# Patient Record
Sex: MALE | Employment: OTHER | URBAN - METROPOLITAN AREA
[De-identification: names, ages, dates, MRNs, and addresses within clinical notes are randomized per-mention and may not be internally consistent; named-entity substitution may affect disease eponyms.]

---

## 2019-07-17 ENCOUNTER — HOSPITAL ENCOUNTER (OUTPATIENT)
Facility: CLINIC | Age: 64
End: 2019-07-17

## 2019-07-17 ENCOUNTER — HOSPITAL ENCOUNTER (INPATIENT)
Facility: CLINIC | Age: 64
LOS: 2 days | Discharge: HOME | DRG: 229 | End: 2019-07-19
Attending: INTERNAL MEDICINE | Admitting: INTERNAL MEDICINE

## 2019-07-17 DIAGNOSIS — I21.19 ST ELEVATION MYOCARDIAL INFARCTION (STEMI) OF INFERIOR WALL (H): ICD-10-CM

## 2019-07-17 DIAGNOSIS — I10 ESSENTIAL HYPERTENSION, MALIGNANT: ICD-10-CM

## 2019-07-17 DIAGNOSIS — I21.19 ACUTE ST ELEVATION MYOCARDIAL INFARCTION (STEMI) OF INFERIOR WALL (H): Primary | ICD-10-CM

## 2019-07-17 LAB
ALBUMIN SERPL-MCNC: 3 G/DL (ref 3.4–5)
ALP SERPL-CCNC: 80 U/L (ref 40–150)
ALT SERPL W P-5'-P-CCNC: 25 U/L (ref 0–70)
ANION GAP SERPL CALCULATED.3IONS-SCNC: 9 MMOL/L (ref 3–14)
AST SERPL W P-5'-P-CCNC: 21 U/L (ref 0–45)
B-HCG FREE SERPL-ACNC: 1 [IU]/L (ref 0.86–1.14)
BILIRUB SERPL-MCNC: 0.5 MG/DL (ref 0.2–1.3)
BUN SERPL-MCNC: 17 MG/DL (ref 7–30)
CA-I BLD-SCNC: 4 MG/DL (ref 4.4–5.2)
CALCIUM SERPL-MCNC: 7.4 MG/DL (ref 8.5–10.1)
CHLORIDE SERPL-SCNC: 108 MMOL/L (ref 94–109)
CO2 BLD-SCNC: 22 MMOL/L (ref 21–28)
CO2 SERPL-SCNC: 24 MMOL/L (ref 20–32)
CREAT BLD-MCNC: 1.4 MG/DL (ref 0.66–1.25)
CREAT SERPL-MCNC: 1.38 MG/DL (ref 0.66–1.25)
ERYTHROCYTE [DISTWIDTH] IN BLOOD BY AUTOMATED COUNT: 12.2 % (ref 10–15)
GFR SERPL CREATININE-BSD FRML MDRD: 51 ML/MIN/{1.73_M2}
GFR SERPL CREATININE-BSD FRML MDRD: 53 ML/MIN/{1.73_M2}
GLUCOSE BLD-MCNC: 135 MG/DL (ref 70–99)
GLUCOSE SERPL-MCNC: 133 MG/DL (ref 70–99)
HCT VFR BLD AUTO: 41.5 % (ref 40–53)
HCT VFR BLD CALC: 36 %PCV (ref 40–53)
HGB BLD CALC-MCNC: 12.2 G/DL (ref 13.3–17.7)
HGB BLD-MCNC: 13.4 G/DL (ref 13.3–17.7)
INR PPP: 1.07 (ref 0.86–1.14)
KCT BLD-ACNC: 231 SEC (ref 75–150)
KCT BLD-ACNC: 255 SEC (ref 75–150)
KCT BLD-ACNC: 360 SEC (ref 75–150)
KCT BLD-ACNC: 384 SEC (ref 75–150)
MCH RBC QN AUTO: 31.2 PG (ref 26.5–33)
MCHC RBC AUTO-ENTMCNC: 32.3 G/DL (ref 31.5–36.5)
MCV RBC AUTO: 97 FL (ref 78–100)
NT-PROBNP SERPL-MCNC: 76 PG/ML (ref 0–900)
PCO2 BLD: 49 MM HG (ref 35–45)
PH BLD: 7.27 PH (ref 7.35–7.45)
PLATELET # BLD AUTO: 207 10E9/L (ref 150–450)
PO2 BLD: 166 MM HG (ref 80–105)
POTASSIUM BLD-SCNC: 2.9 MMOL/L (ref 3.4–5.3)
POTASSIUM SERPL-SCNC: 2.8 MMOL/L (ref 3.4–5.3)
PROT SERPL-MCNC: 6.4 G/DL (ref 6.8–8.8)
RBC # BLD AUTO: 4.3 10E12/L (ref 4.4–5.9)
SAO2 % BLDA FROM PO2: 99 % (ref 92–100)
SODIUM BLD-SCNC: 142 MMOL/L (ref 133–144)
SODIUM SERPL-SCNC: 140 MMOL/L (ref 133–144)
TROPONIN I BLD-MCNC: 0 UG/L (ref 0–0.08)
TROPONIN I SERPL-MCNC: <0.015 UG/L (ref 0–0.04)
WBC # BLD AUTO: 14.3 10E9/L (ref 4–11)

## 2019-07-17 PROCEDURE — 99285 EMERGENCY DEPT VISIT HI MDM: CPT | Mod: 25 | Performed by: EMERGENCY MEDICINE

## 2019-07-17 PROCEDURE — 84295 ASSAY OF SERUM SODIUM: CPT

## 2019-07-17 PROCEDURE — 80053 COMPREHEN METABOLIC PANEL: CPT | Performed by: EMERGENCY MEDICINE

## 2019-07-17 PROCEDURE — 96374 THER/PROPH/DIAG INJ IV PUSH: CPT | Performed by: EMERGENCY MEDICINE

## 2019-07-17 PROCEDURE — 20000004 ZZH R&B ICU UMMC

## 2019-07-17 PROCEDURE — 84484 ASSAY OF TROPONIN QUANT: CPT | Performed by: EMERGENCY MEDICINE

## 2019-07-17 PROCEDURE — C1887 CATHETER, GUIDING: HCPCS | Performed by: INTERNAL MEDICINE

## 2019-07-17 PROCEDURE — 0272376 DILATION OF CORONARY ARTERY, THREE ARTERIES, BIFURCATION, WITH FOUR OR MORE DRUG-ELUTING INTRALUMINAL DEVICES, PERCUTANEOUS APPROACH: ICD-10-PCS | Performed by: INTERNAL MEDICINE

## 2019-07-17 PROCEDURE — 80061 LIPID PANEL: CPT | Performed by: INTERNAL MEDICINE

## 2019-07-17 PROCEDURE — 99291 CRITICAL CARE FIRST HOUR: CPT | Mod: 25 | Performed by: EMERGENCY MEDICINE

## 2019-07-17 PROCEDURE — 25000125 ZZHC RX 250: Performed by: INTERNAL MEDICINE

## 2019-07-17 PROCEDURE — 84484 ASSAY OF TROPONIN QUANT: CPT | Performed by: INTERNAL MEDICINE

## 2019-07-17 PROCEDURE — 82565 ASSAY OF CREATININE: CPT

## 2019-07-17 PROCEDURE — 25000128 H RX IP 250 OP 636: Performed by: EMERGENCY MEDICINE

## 2019-07-17 PROCEDURE — 25000128 H RX IP 250 OP 636

## 2019-07-17 PROCEDURE — 93010 ELECTROCARDIOGRAM REPORT: CPT | Mod: Z6 | Performed by: EMERGENCY MEDICINE

## 2019-07-17 PROCEDURE — C9600 PERC DRUG-EL COR STENT SING: HCPCS | Performed by: INTERNAL MEDICINE

## 2019-07-17 PROCEDURE — 85014 HEMATOCRIT: CPT

## 2019-07-17 PROCEDURE — 25000128 H RX IP 250 OP 636: Performed by: INTERNAL MEDICINE

## 2019-07-17 PROCEDURE — C1769 GUIDE WIRE: HCPCS | Performed by: INTERNAL MEDICINE

## 2019-07-17 PROCEDURE — C9606 PERC D-E COR REVASC W AMI S: HCPCS | Mod: RC | Performed by: INTERNAL MEDICINE

## 2019-07-17 PROCEDURE — 25000132 ZZH RX MED GY IP 250 OP 250 PS 637: Performed by: EMERGENCY MEDICINE

## 2019-07-17 PROCEDURE — 85610 PROTHROMBIN TIME: CPT | Performed by: EMERGENCY MEDICINE

## 2019-07-17 PROCEDURE — C1757 CATH, THROMBECTOMY/EMBOLECT: HCPCS | Performed by: INTERNAL MEDICINE

## 2019-07-17 PROCEDURE — 99223 1ST HOSP IP/OBS HIGH 75: CPT | Mod: AI | Performed by: INTERNAL MEDICINE

## 2019-07-17 PROCEDURE — 25000128 H RX IP 250 OP 636: Performed by: FAMILY MEDICINE

## 2019-07-17 PROCEDURE — 85027 COMPLETE CBC AUTOMATED: CPT | Performed by: EMERGENCY MEDICINE

## 2019-07-17 PROCEDURE — 99153 MOD SED SAME PHYS/QHP EA: CPT | Performed by: INTERNAL MEDICINE

## 2019-07-17 PROCEDURE — 02C00ZZ EXTIRPATION OF MATTER FROM CORONARY ARTERY, ONE ARTERY, OPEN APPROACH: ICD-10-PCS | Performed by: INTERNAL MEDICINE

## 2019-07-17 PROCEDURE — 82330 ASSAY OF CALCIUM: CPT

## 2019-07-17 PROCEDURE — 93010 ELECTROCARDIOGRAM REPORT: CPT | Mod: 76 | Performed by: INTERNAL MEDICINE

## 2019-07-17 PROCEDURE — 96375 TX/PRO/DX INJ NEW DRUG ADDON: CPT | Performed by: EMERGENCY MEDICINE

## 2019-07-17 PROCEDURE — 36415 COLL VENOUS BLD VENIPUNCTURE: CPT | Performed by: INTERNAL MEDICINE

## 2019-07-17 PROCEDURE — 84484 ASSAY OF TROPONIN QUANT: CPT

## 2019-07-17 PROCEDURE — C1874 STENT, COATED/COV W/DEL SYS: HCPCS | Performed by: INTERNAL MEDICINE

## 2019-07-17 PROCEDURE — C1725 CATH, TRANSLUMIN NON-LASER: HCPCS | Performed by: INTERNAL MEDICINE

## 2019-07-17 PROCEDURE — 85610 PROTHROMBIN TIME: CPT

## 2019-07-17 PROCEDURE — 93454 CORONARY ARTERY ANGIO S&I: CPT | Mod: XU | Performed by: INTERNAL MEDICINE

## 2019-07-17 PROCEDURE — 99152 MOD SED SAME PHYS/QHP 5/>YRS: CPT | Performed by: INTERNAL MEDICINE

## 2019-07-17 PROCEDURE — 93005 ELECTROCARDIOGRAM TRACING: CPT | Performed by: EMERGENCY MEDICINE

## 2019-07-17 PROCEDURE — C1894 INTRO/SHEATH, NON-LASER: HCPCS | Performed by: INTERNAL MEDICINE

## 2019-07-17 PROCEDURE — 83880 ASSAY OF NATRIURETIC PEPTIDE: CPT | Performed by: EMERGENCY MEDICINE

## 2019-07-17 PROCEDURE — 82803 BLOOD GASES ANY COMBINATION: CPT

## 2019-07-17 PROCEDURE — 27210794 ZZH OR GENERAL SUPPLY STERILE: Performed by: INTERNAL MEDICINE

## 2019-07-17 PROCEDURE — 82947 ASSAY GLUCOSE BLOOD QUANT: CPT

## 2019-07-17 PROCEDURE — 85347 COAGULATION TIME ACTIVATED: CPT

## 2019-07-17 PROCEDURE — 84132 ASSAY OF SERUM POTASSIUM: CPT

## 2019-07-17 PROCEDURE — C1760 CLOSURE DEV, VASC: HCPCS | Performed by: INTERNAL MEDICINE

## 2019-07-17 DEVICE — STENT SYNERGY DRUG ELUTING 3.00X38MM  H7493926038300: Type: IMPLANTABLE DEVICE | Status: FUNCTIONAL

## 2019-07-17 DEVICE — STENT SYNERGY DRUG ELUTING 4.00X28MM  H7493926028400: Type: IMPLANTABLE DEVICE | Status: FUNCTIONAL

## 2019-07-17 DEVICE — STENT SYNERGY DRUG ELUTING 4.00X12MM  H7493926012400: Type: IMPLANTABLE DEVICE | Status: FUNCTIONAL

## 2019-07-17 DEVICE — STENT SYNERGY DRUG ELUTING 3.00X32MM  H7493926032300: Type: IMPLANTABLE DEVICE | Status: FUNCTIONAL

## 2019-07-17 RX ORDER — MORPHINE SULFATE 2 MG/ML
2 INJECTION, SOLUTION INTRAMUSCULAR; INTRAVENOUS ONCE
Status: COMPLETED | OUTPATIENT
Start: 2019-07-17 | End: 2019-07-17

## 2019-07-17 RX ORDER — HEPARIN SODIUM 10000 [USP'U]/100ML
0-3500 INJECTION, SOLUTION INTRAVENOUS CONTINUOUS
Status: DISCONTINUED | OUTPATIENT
Start: 2019-07-17 | End: 2019-07-17

## 2019-07-17 RX ORDER — NALOXONE HYDROCHLORIDE 0.4 MG/ML
.1-.4 INJECTION, SOLUTION INTRAMUSCULAR; INTRAVENOUS; SUBCUTANEOUS
Status: DISCONTINUED | OUTPATIENT
Start: 2019-07-17 | End: 2019-07-19 | Stop reason: HOSPADM

## 2019-07-17 RX ORDER — ATROPINE SULFATE 0.1 MG/ML
INJECTION INTRAVENOUS
Status: DISCONTINUED | OUTPATIENT
Start: 2019-07-17 | End: 2019-07-17 | Stop reason: HOSPADM

## 2019-07-17 RX ORDER — NALOXONE HYDROCHLORIDE 0.4 MG/ML
.2-.4 INJECTION, SOLUTION INTRAMUSCULAR; INTRAVENOUS; SUBCUTANEOUS
Status: ACTIVE | OUTPATIENT
Start: 2019-07-17 | End: 2019-07-18

## 2019-07-17 RX ORDER — SODIUM CHLORIDE 9 MG/ML
INJECTION, SOLUTION INTRAVENOUS CONTINUOUS
Status: ACTIVE | OUTPATIENT
Start: 2019-07-17 | End: 2019-07-18

## 2019-07-17 RX ORDER — EPTIFIBATIDE 2 MG/ML
180 INJECTION, SOLUTION INTRAVENOUS ONCE
Status: COMPLETED | OUTPATIENT
Start: 2019-07-17 | End: 2019-07-17

## 2019-07-17 RX ORDER — POTASSIUM CHLORIDE 7.45 MG/ML
10 INJECTION INTRAVENOUS ONCE
Status: COMPLETED | OUTPATIENT
Start: 2019-07-17 | End: 2019-07-17

## 2019-07-17 RX ORDER — LISINOPRIL 2.5 MG/1
5 TABLET ORAL DAILY
Status: DISCONTINUED | OUTPATIENT
Start: 2019-07-18 | End: 2019-07-17 | Stop reason: CLARIF

## 2019-07-17 RX ORDER — HEPARIN SODIUM 1000 [USP'U]/ML
INJECTION, SOLUTION INTRAVENOUS; SUBCUTANEOUS
Status: DISCONTINUED | OUTPATIENT
Start: 2019-07-17 | End: 2019-07-17 | Stop reason: HOSPADM

## 2019-07-17 RX ORDER — LISINOPRIL 2.5 MG/1
2.5 TABLET ORAL DAILY
Status: DISCONTINUED | OUTPATIENT
Start: 2019-07-17 | End: 2019-07-19

## 2019-07-17 RX ORDER — NITROGLYCERIN 0.4 MG/1
0.4 TABLET SUBLINGUAL EVERY 5 MIN PRN
Status: DISCONTINUED | OUTPATIENT
Start: 2019-07-17 | End: 2019-07-19 | Stop reason: HOSPADM

## 2019-07-17 RX ORDER — IOPAMIDOL 755 MG/ML
INJECTION, SOLUTION INTRAVASCULAR
Status: DISCONTINUED | OUTPATIENT
Start: 2019-07-17 | End: 2019-07-17 | Stop reason: HOSPADM

## 2019-07-17 RX ORDER — ATORVASTATIN CALCIUM 80 MG/1
80 TABLET, FILM COATED ORAL DAILY
Status: DISCONTINUED | OUTPATIENT
Start: 2019-07-18 | End: 2019-07-19 | Stop reason: HOSPADM

## 2019-07-17 RX ORDER — ASPIRIN 81 MG/1
81 TABLET ORAL DAILY
Status: DISCONTINUED | OUTPATIENT
Start: 2019-07-18 | End: 2019-07-19 | Stop reason: HOSPADM

## 2019-07-17 RX ORDER — ONDANSETRON 2 MG/ML
4 INJECTION INTRAMUSCULAR; INTRAVENOUS EVERY 6 HOURS PRN
Status: DISCONTINUED | OUTPATIENT
Start: 2019-07-17 | End: 2019-07-19 | Stop reason: HOSPADM

## 2019-07-17 RX ORDER — EPTIFIBATIDE 2 MG/ML
2 INJECTION, SOLUTION INTRAVENOUS CONTINUOUS
Status: ACTIVE | OUTPATIENT
Start: 2019-07-17 | End: 2019-07-18

## 2019-07-17 RX ORDER — FLUMAZENIL 0.1 MG/ML
0.2 INJECTION, SOLUTION INTRAVENOUS
Status: ACTIVE | OUTPATIENT
Start: 2019-07-17 | End: 2019-07-18

## 2019-07-17 RX ORDER — PHENYLEPHRINE HYDROCHLORIDE 10 MG/ML
INJECTION INTRAVENOUS
Status: DISCONTINUED | OUTPATIENT
Start: 2019-07-17 | End: 2019-07-17 | Stop reason: HOSPADM

## 2019-07-17 RX ORDER — MORPHINE SULFATE 2 MG/ML
INJECTION, SOLUTION INTRAMUSCULAR; INTRAVENOUS
Status: COMPLETED
Start: 2019-07-17 | End: 2019-07-17

## 2019-07-17 RX ORDER — FENTANYL CITRATE 50 UG/ML
INJECTION, SOLUTION INTRAMUSCULAR; INTRAVENOUS
Status: DISCONTINUED | OUTPATIENT
Start: 2019-07-17 | End: 2019-07-17 | Stop reason: HOSPADM

## 2019-07-17 RX ORDER — ATROPINE SULFATE 0.1 MG/ML
0.5 INJECTION INTRAVENOUS EVERY 5 MIN PRN
Status: ACTIVE | OUTPATIENT
Start: 2019-07-17 | End: 2019-07-18

## 2019-07-17 RX ORDER — FENTANYL CITRATE 50 UG/ML
25-50 INJECTION, SOLUTION INTRAMUSCULAR; INTRAVENOUS
Status: ACTIVE | OUTPATIENT
Start: 2019-07-17 | End: 2019-07-18

## 2019-07-17 RX ADMIN — POTASSIUM CHLORIDE 10 MEQ: 7.46 INJECTION, SOLUTION INTRAVENOUS at 21:04

## 2019-07-17 RX ADMIN — POTASSIUM CHLORIDE 10 MEQ: 7.46 INJECTION, SOLUTION INTRAVENOUS at 20:04

## 2019-07-17 RX ADMIN — HEPARIN SODIUM 900 UNITS/HR: 10000 INJECTION, SOLUTION INTRAVENOUS at 19:18

## 2019-07-17 RX ADMIN — MORPHINE SULFATE 2 MG: 2 INJECTION, SOLUTION INTRAMUSCULAR; INTRAVENOUS at 19:20

## 2019-07-17 RX ADMIN — EPTIFIBATIDE 2 MCG/KG/MIN: 2 INJECTION, SOLUTION INTRAVENOUS at 21:05

## 2019-07-17 RX ADMIN — TICAGRELOR 180 MG: 90 TABLET ORAL at 19:15

## 2019-07-17 RX ADMIN — EPTIFIBATIDE 2 MG: 2 INJECTION, SOLUTION INTRAVENOUS at 21:04

## 2019-07-17 RX ADMIN — MORPHINE SULFATE 2 MG: 2 INJECTION, SOLUTION INTRAMUSCULAR; INTRAVENOUS at 19:10

## 2019-07-17 ASSESSMENT — MIFFLIN-ST. JEOR
SCORE: 1460.75
SCORE: 1460.75

## 2019-07-17 NOTE — Clinical Note
The first balloon was inserted into the circumflex and proximal circumflex.Max pressure = 8 danielle. Total duration = 8 seconds.     Max pressure = 8 danielle. Total duration = 5 seconds.    Balloon reinflated a second time: Max pressure = 8 danielle. Total duration = 5 seconds.

## 2019-07-17 NOTE — Clinical Note
Stent deployed in the proximal circumflex. Max pressure = 11 danielle. Total duration = 6 seconds. Balloon reinflated a second time: Max pressure = 13 danielle. Total duration = 5 seconds.

## 2019-07-17 NOTE — Clinical Note
The first balloon was inserted into the left main and left main.Max pressure = 10 danielle. Total duration = 5 seconds.

## 2019-07-17 NOTE — Clinical Note
dry, intact, no bleeding and no hematoma. 6 Fr sheath removed from the RFA. 6 Fr perclose used to close the groin. Tegaderm applied.

## 2019-07-17 NOTE — Clinical Note
The first balloon was inserted into the right coronary artery and proximal right coronary artery.Max pressure = 12 danielle. Total duration = 8 seconds.     Max pressure = 12 danielle. Total duration = 5 seconds.    Balloon reinflated a second time: Max pressure = 12 danielle. Total duration = 5 seconds.

## 2019-07-17 NOTE — Clinical Note
The first balloon was inserted into the right coronary artery and proximal right coronary artery.Max pressure = 8 danielle. Total duration = 10 seconds.

## 2019-07-17 NOTE — Clinical Note
Stent deployed in the proximal right coronary artery. Max pressure = 14 danielle. Total duration = 10 seconds.

## 2019-07-17 NOTE — Clinical Note
Pt received 4600 units of Heparin bolus, Brilinta 180 mg and  mg in the ED per ED RN report. MD olmos.

## 2019-07-17 NOTE — Clinical Note
The first balloon was inserted into the left main and left main.Max pressure = 8 danielle. Total duration = 4 seconds.     Max pressure = 18 danielle. Total duration = 8 seconds.    Balloon reinflated a second time: Max pressure = 18 danielle. Total duration = 8 seconds.

## 2019-07-17 NOTE — Clinical Note
The first balloon was inserted into the circumflex and distal circumflex.Max pressure = 11 danielle. Total duration = 6 seconds.

## 2019-07-18 ENCOUNTER — APPOINTMENT (OUTPATIENT)
Dept: MRI IMAGING | Facility: CLINIC | Age: 64
DRG: 229 | End: 2019-07-18

## 2019-07-18 LAB
ANION GAP SERPL CALCULATED.3IONS-SCNC: 10 MMOL/L (ref 3–14)
BUN SERPL-MCNC: 19 MG/DL (ref 7–30)
CALCIUM SERPL-MCNC: 7.8 MG/DL (ref 8.5–10.1)
CHLORIDE SERPL-SCNC: 108 MMOL/L (ref 94–109)
CHOLEST SERPL-MCNC: 215 MG/DL
CO2 SERPL-SCNC: 22 MMOL/L (ref 20–32)
CREAT SERPL-MCNC: 1.33 MG/DL (ref 0.66–1.25)
ERYTHROCYTE [DISTWIDTH] IN BLOOD BY AUTOMATED COUNT: 12.6 % (ref 10–15)
GFR SERPL CREATININE-BSD FRML MDRD: 55 ML/MIN/{1.73_M2}
GLUCOSE BLDC GLUCOMTR-MCNC: 132 MG/DL (ref 70–99)
GLUCOSE SERPL-MCNC: 125 MG/DL (ref 70–99)
HBA1C MFR BLD: 5.9 % (ref 0–5.6)
HCT VFR BLD AUTO: 46 % (ref 40–53)
HDLC SERPL-MCNC: 40 MG/DL
HGB BLD-MCNC: 14.6 G/DL (ref 13.3–17.7)
INTERPRETATION ECG - MUSE: NORMAL
LDLC SERPL CALC-MCNC: 158 MG/DL
LMWH PPP CHRO-ACNC: <0.1 IU/ML
MCH RBC QN AUTO: 31.7 PG (ref 26.5–33)
MCHC RBC AUTO-ENTMCNC: 31.7 G/DL (ref 31.5–36.5)
MCV RBC AUTO: 100 FL (ref 78–100)
NONHDLC SERPL-MCNC: 175 MG/DL
PLATELET # BLD AUTO: 215 10E9/L (ref 150–450)
POTASSIUM SERPL-SCNC: 4.7 MMOL/L (ref 3.4–5.3)
RBC # BLD AUTO: 4.6 10E12/L (ref 4.4–5.9)
SODIUM SERPL-SCNC: 140 MMOL/L (ref 133–144)
TRIGL SERPL-MCNC: 83 MG/DL
TROPONIN I SERPL-MCNC: 35.73 UG/L (ref 0–0.04)
TROPONIN I SERPL-MCNC: 59.83 UG/L (ref 0–0.04)
TROPONIN I SERPL-MCNC: 83.55 UG/L (ref 0–0.04)
WBC # BLD AUTO: 16 10E9/L (ref 4–11)

## 2019-07-18 PROCEDURE — 83036 HEMOGLOBIN GLYCOSYLATED A1C: CPT | Performed by: INTERNAL MEDICINE

## 2019-07-18 PROCEDURE — 00000146 ZZHCL STATISTIC GLUCOSE BY METER IP

## 2019-07-18 PROCEDURE — 25800030 ZZH RX IP 258 OP 636: Performed by: INTERNAL MEDICINE

## 2019-07-18 PROCEDURE — 25500064 ZZH RX 255 OP 636: Performed by: INTERNAL MEDICINE

## 2019-07-18 PROCEDURE — 36415 COLL VENOUS BLD VENIPUNCTURE: CPT | Performed by: EMERGENCY MEDICINE

## 2019-07-18 PROCEDURE — 36415 COLL VENOUS BLD VENIPUNCTURE: CPT | Performed by: INTERNAL MEDICINE

## 2019-07-18 PROCEDURE — A9585 GADOBUTROL INJECTION: HCPCS | Performed by: INTERNAL MEDICINE

## 2019-07-18 PROCEDURE — 25000128 H RX IP 250 OP 636: Performed by: INTERNAL MEDICINE

## 2019-07-18 PROCEDURE — 25000132 ZZH RX MED GY IP 250 OP 250 PS 637: Performed by: INTERNAL MEDICINE

## 2019-07-18 PROCEDURE — 85027 COMPLETE CBC AUTOMATED: CPT | Performed by: INTERNAL MEDICINE

## 2019-07-18 PROCEDURE — 93010 ELECTROCARDIOGRAM REPORT: CPT | Performed by: INTERNAL MEDICINE

## 2019-07-18 PROCEDURE — 93005 ELECTROCARDIOGRAM TRACING: CPT

## 2019-07-18 PROCEDURE — 25000132 ZZH RX MED GY IP 250 OP 250 PS 637: Performed by: STUDENT IN AN ORGANIZED HEALTH CARE EDUCATION/TRAINING PROGRAM

## 2019-07-18 PROCEDURE — 75561 CARDIAC MRI FOR MORPH W/DYE: CPT | Mod: 26 | Performed by: INTERNAL MEDICINE

## 2019-07-18 PROCEDURE — 21400000 ZZH R&B CCU UMMC

## 2019-07-18 PROCEDURE — 80048 BASIC METABOLIC PNL TOTAL CA: CPT | Performed by: INTERNAL MEDICINE

## 2019-07-18 PROCEDURE — 99233 SBSQ HOSP IP/OBS HIGH 50: CPT | Mod: GC | Performed by: INTERNAL MEDICINE

## 2019-07-18 PROCEDURE — 75561 CARDIAC MRI FOR MORPH W/DYE: CPT

## 2019-07-18 PROCEDURE — 84484 ASSAY OF TROPONIN QUANT: CPT | Performed by: INTERNAL MEDICINE

## 2019-07-18 PROCEDURE — 85520 HEPARIN ASSAY: CPT | Performed by: EMERGENCY MEDICINE

## 2019-07-18 RX ORDER — ACETAMINOPHEN 325 MG/1
650 TABLET ORAL EVERY 4 HOURS PRN
Status: DISCONTINUED | OUTPATIENT
Start: 2019-07-18 | End: 2019-07-18

## 2019-07-18 RX ORDER — POTASSIUM CHLORIDE 1.5 G/1.58G
20-40 POWDER, FOR SOLUTION ORAL
Status: DISCONTINUED | OUTPATIENT
Start: 2019-07-18 | End: 2019-07-19 | Stop reason: HOSPADM

## 2019-07-18 RX ORDER — GADOBUTROL 604.72 MG/ML
10 INJECTION INTRAVENOUS ONCE
Status: COMPLETED | OUTPATIENT
Start: 2019-07-18 | End: 2019-07-18

## 2019-07-18 RX ORDER — POTASSIUM CL/LIDO/0.9 % NACL 10MEQ/0.1L
10 INTRAVENOUS SOLUTION, PIGGYBACK (ML) INTRAVENOUS
Status: DISCONTINUED | OUTPATIENT
Start: 2019-07-18 | End: 2019-07-19 | Stop reason: HOSPADM

## 2019-07-18 RX ORDER — POTASSIUM CHLORIDE 7.45 MG/ML
10 INJECTION INTRAVENOUS
Status: DISCONTINUED | OUTPATIENT
Start: 2019-07-18 | End: 2019-07-19 | Stop reason: HOSPADM

## 2019-07-18 RX ORDER — MAGNESIUM SULFATE HEPTAHYDRATE 40 MG/ML
4 INJECTION, SOLUTION INTRAVENOUS EVERY 4 HOURS PRN
Status: DISCONTINUED | OUTPATIENT
Start: 2019-07-18 | End: 2019-07-19 | Stop reason: HOSPADM

## 2019-07-18 RX ORDER — POTASSIUM CHLORIDE 29.8 MG/ML
20 INJECTION INTRAVENOUS
Status: DISCONTINUED | OUTPATIENT
Start: 2019-07-18 | End: 2019-07-19 | Stop reason: HOSPADM

## 2019-07-18 RX ORDER — POTASSIUM CHLORIDE 750 MG/1
20-40 TABLET, EXTENDED RELEASE ORAL
Status: DISCONTINUED | OUTPATIENT
Start: 2019-07-18 | End: 2019-07-19 | Stop reason: HOSPADM

## 2019-07-18 RX ORDER — ACETAMINOPHEN 325 MG/1
325 TABLET ORAL EVERY 4 HOURS PRN
Status: DISCONTINUED | OUTPATIENT
Start: 2019-07-18 | End: 2019-07-19 | Stop reason: HOSPADM

## 2019-07-18 RX ADMIN — TICAGRELOR 90 MG: 90 TABLET ORAL at 08:29

## 2019-07-18 RX ADMIN — TICAGRELOR 90 MG: 90 TABLET ORAL at 20:20

## 2019-07-18 RX ADMIN — LISINOPRIL 2.5 MG: 2.5 TABLET ORAL at 00:03

## 2019-07-18 RX ADMIN — SODIUM CHLORIDE: 9 INJECTION, SOLUTION INTRAVENOUS at 00:04

## 2019-07-18 RX ADMIN — LISINOPRIL 2.5 MG: 2.5 TABLET ORAL at 07:38

## 2019-07-18 RX ADMIN — ATORVASTATIN CALCIUM 80 MG: 80 TABLET, FILM COATED ORAL at 07:38

## 2019-07-18 RX ADMIN — ACETAMINOPHEN 325 MG: 325 TABLET, FILM COATED ORAL at 20:19

## 2019-07-18 RX ADMIN — Medication 6.25 MG: at 21:51

## 2019-07-18 RX ADMIN — ASPIRIN 81 MG: 81 TABLET, COATED ORAL at 07:37

## 2019-07-18 RX ADMIN — Medication 6.25 MG: at 01:13

## 2019-07-18 RX ADMIN — Medication 6.25 MG: at 07:38

## 2019-07-18 RX ADMIN — ONDANSETRON 4 MG: 2 INJECTION INTRAMUSCULAR; INTRAVENOUS at 02:04

## 2019-07-18 RX ADMIN — GADOBUTROL 10 ML: 604.72 INJECTION INTRAVENOUS at 12:04

## 2019-07-18 ASSESSMENT — ACTIVITIES OF DAILY LIVING (ADL)
ADLS_ACUITY_SCORE: 10

## 2019-07-18 ASSESSMENT — PAIN DESCRIPTION - DESCRIPTORS: DESCRIPTORS: DISCOMFORT;DULL

## 2019-07-18 NOTE — PLAN OF CARE
D: 65 yo male admitted via EMS with CP, found to have 3 vessel disease and stents x 5 placed in Left main, RCA, and circumflex.    I/A: Remained on Integrilin gtt until 1330.  Sheath removed prior to transfer to .  Denies CP, nausea episode overnight x1 and one episode vomiting, relieved by Zofran x 1.  Poor appetite this AM verbalized to MD who reassured him this can be very common with this type MI.  Expedited to MRI @ 1100 with son (Bryn) present and .  Returned to  @ 1245.  Remains A & O x4, independently positioning self in bed and independent up to  for transport.    P: To 6C 19-1 via  with charge RN and son @ 0727.  Anticipate discharge home either tomorrow or Saturday with follow up planned in [facility] or [facility]? Is here visiting x 1 month.

## 2019-07-18 NOTE — PLAN OF CARE
Admitted/transferred from: Cath lab  Reason for admission/transfer: Inferior STEMI  Patient status upon admission/transfer:   Interventions: 3 Vessel stent placement  Plan: Monitor and address as needed.   2 RN skin assessment: completed by Kenton Caraballo and Bryn Clements  Result of skin assessment and interventions/actions:  No issues noted  Height, weight, drug calc weight: done  Patient belongings: With pt, in Room  MDRO education (if applicable):   Pt family in room VSS, seen by MD and team. No issues noted, will continue to monitor and address as needed.   DTTS:   integralin at 4.5 ml/hr

## 2019-07-18 NOTE — ED PROVIDER NOTES
History     Chief Complaint   Patient presents with     Chest Pain     The history is provided by the EMS personnel.     Yuri Louis is a 64 year old [place] male who presents to the Emergency Department today for evaluation of chest pain and altered mental status. The patient is visiting from the [facility]. The patient is altered with limited English making history limited. History is provided primarily by EMS. EMS reports that about 45 minutes prior to arrival to the ED, the patient began having chest pain and shortness of breath. EMS was called and upon their arrival the patient was noted to be altered, namely drowsy. He continues to be altered here in the ED. Family reported to EMS that the patient did not have any cardiac history. Reportedly has a history of HTN but no regular medications.  Patient reports currently having chest pain.  Denies pain elsewhere.      I have reviewed the Medications, Allergies, Past Medical and Surgical History, and Social History in the Epic system.    No past medical history on file.    No past surgical history on file.    No family history on file.    Social History     Tobacco Use     Smoking status: Not on file   Substance Use Topics     Alcohol use: Not on file     Current Facility-Administered Medications   Medication     morphine (PF) 2 MG/ML injection     heparin  drip 25,000 units in 0.45% NaCl 250 mL (see additional administration details for dose)     heparin bolus from infusion pump     heparin bolus from infusion pump     heparin Loading Dose from infusion pump *Give when STARTING heparin infusion 4,600 Units     morphine (PF) injection 2 mg     ticagrelor (BRILINTA) tablet 180 mg     No current outpatient medications on file.      No Known Allergies     Review of Systems   Unable to perform ROS: Mental status change     Physical Exam      Physical Exam  General: patient is alert and oriented and in no acute distress   Head: atraumatic and normocephalic    EENT: moist mucus membranes without tonsillar erythema or exudates, pupils round and reactive   Neck: supple   Cardiovascular: regular rate and rhythm, extremities warm and well perfused, no lower extremity edema  Pulmonary: lungs clear to auscultation bilaterally   Abdomen: soft, non-tender   Musculoskeletal: normal range of motion   Neurological: alert and oriented, moving all extremities symmetrically, gait normal   Skin: warm, dry  ED Course   7:04 PM  The patient was seen and examined by Dr. Ibanez in Room 01.       Procedures             EKG Interpretation:      Interpreted by Cathleen Ibanez  Time reviewed: 1910  Symptoms at time of EKG: chest pain   Rhythm: sinus bradycardia and 1 degree AV block  Rate: Bradycardia  Axis: Normal  Ectopy: none  Conduction: 1st degree AV block  ST Segments/ T Waves: ST Segement Elevation II, III and aVF  Q Waves: III and aVf  Comparison to prior: No old EKG available    Clinical Impression: myocardial infarction  inferior                Critical Care time:  was 30 minutes for this patient excluding procedures.             Labs Ordered and Resulted from Time of ED Arrival Up to the Time of Departure from the ED - No data to display         Assessments & Plan (with Medical Decision Making)   Mr. Louis is a 64 year old [place] male who presents to the Emergency Department today for evaluation of chest pain and altered mental status.  Patient's ECG is consistent with acute inferior MI.  Cath Lab was activated prior to arrival.  Patient has received 324 aspirin prior to arrival.  In the ED he was given additional 180 mg of Brilinta and heparin load.  He was given 4 mg of morphine.  He has been bradycardic and hypotensive and nitro has been held.  He did receive 1 L IV fluids.  Patient is drowsy but is able to respond to verbal stimuli and answer some questions.  No evidence of airway compromise.  Bedside cardiac ultrasound is consistent with wall motion abnormality.   Patient was taken to Cath Lab for further management of acute MI.    I have reviewed the nursing notes.    I have reviewed the findings, diagnosis, plan and need for follow up with the patient.     Medication List      There are no discharge medications for this visit.       Final diagnoses:   Acute ST elevation myocardial infarction (STEMI) of inferior wall (H)   IArt, am serving as a trained medical scribe to document services personally performed by Cathleen Ibanez MD, based on the provider's statements to me.   ICathleen MD, was physically present and have reviewed and verified the accuracy of this note documented by Art Abdi.     7/17/2019   [facility], [place], EMERGENCY DEPARTMENT     Cathleen Ibanez MD  07/18/19 0454

## 2019-07-18 NOTE — H&P
Cardiology History and Physical, [facility]  Patient: Yuri Louis MRN# [id number]   Age: 64 year old YOB: 1955     Attending physician:   Date of Admission: 7/17/2019  Date of Service: 7/17/2019   PCP: No primary care provider on file.          Assessment and Plan:     Yuri Louis is a 64 year old male with inferior STEMI.    # Inferior STEMI  # 3VD CAD  Now stetus post PCI to 3VD with JUANJOSE.  - ticagrelor and ASA  - integrelin gtt  - starting lisinopril 2.5 mg tonight with tirtaring up from tomorrow  - considering BB from tomorrow. Holding tonight due to bradycardia on arrival  - POC echo at ED with inferiorseptum and inferior wall akinesis.   - cardiac MRI ordered     # Hypertension     Code status: Full  FEN: Cardiac  PPX: low risk  Dispo: pending further improvement but likely to home    LINES/TUBES:   Peripheral IV 07/17/19 Right Hand (Active)   Site Assessment [facility] 7/17/2019  7:14 PM   Line Status Infusing 7/17/2019  7:14 PM   Phlebitis Scale 0-->no symptoms 7/17/2019  7:14 PM   Infiltration Scale 0 7/17/2019  7:14 PM   Infiltration Site Treatment Method  None 7/17/2019  7:14 PM   Extravasation? No 7/17/2019  7:14 PM   Number of days: 0       Peripheral IV 07/17/19 Left Upper forearm (Active)   Site Assessment [facility] 7/17/2019  7:14 PM   Line Status Infusing 7/17/2019  7:14 PM   Phlebitis Scale 0-->no symptoms 7/17/2019  7:14 PM   Infiltration Scale 0 7/17/2019  7:14 PM   Infiltration Site Treatment Method  None 7/17/2019  7:14 PM   Extravasation? No 7/17/2019  7:14 PM   Number of days: 0         Discussed with Dr. Nina.    Anthony Kang MD  Cardiology Fellow [phone]                Chief complaint:     Chest Pain          History of Present Illness:     Yuri Louis is a 64 year old [place] male who presented to the Emergency Department today for evaluation of chest pain, shortness of breath, and altered mental status. The patient is visiting from  the [facility]. History is provided by EMS. EMS reports that about 45 minutes prior to arrival to the ED, the patient began having chest pain and shortness of breath. EMS was called and upon their arrival the patient was noted to be altered. However, on arrival to ED Pt He continues to be altered here in the ED.     Family reported to EMS that the patient did not have any cardiac history.      In ED, ticagrelor 180mg ans  mg loaded. Heparin started.  Cath lab activated. % thrombotic occlusion at proximal site where BMS placed.LM, LCX, LAD stented as well.    Cardiac risk factors:  HTN (+), no treatment  HLD (-)  DM2 (-)  Prior smoker until 40 years ago       Review of Systems   10-point ROS reviewed & found negative w/ exceptions noted in the HPI.    Past Medical History      No past medical history on file.    Past Surgical History     No past surgical history on file.    Prior to Admission Medications   None       No current facility-administered medications on file prior to encounter.   No current outpatient medications on file prior to encounter.    Allergies   No Known Allergies    Social History     Social History     Socioeconomic History     Marital status: Not on file     Spouse name: Not on file     Number of children: Not on file     Years of education: Not on file     Highest education level: Not on file   Occupational History     Not on file   Social Needs     Financial resource strain: Not on file     Food insecurity:     Worry: Not on file     Inability: Not on file     Transportation needs:     Medical: Not on file     Non-medical: Not on file   Tobacco Use     Smoking status: Not on file   Substance and Sexual Activity     Alcohol use: Not on file     Drug use: Not on file     Sexual activity: Not on file   Lifestyle     Physical activity:     Days per week: Not on file     Minutes per session: Not on file     Stress: Not on file   Relationships     Social connections:     Talks on  phone: Not on file     Gets together: Not on file     Attends [employer] service: Not on file     Active member of club or organization: Not on file     Attends meetings of clubs or organizations: Not on file     Relationship status: Not on file     Intimate partner violence:     Fear of current or ex partner: Not on file     Emotionally abused: Not on file     Physically abused: Not on file     Forced sexual activity: Not on file   Other Topics Concern     Not on file   Social History Narrative     Not on file       Family History     Non contributory  No family history on file.    Physical Exam       BP: 118/75   Heart Rate: 77 Resp: 16 SpO2: 100 % O2 Device: None (Room air)    Vital Signs with Ranges  Heart Rate:  [77] 77  Resp:  [16] 16  BP: (118)/(75) 118/75  SpO2:  [100 %] 100 %  169 lbs 11.2 oz      GEN:  Alert, oriented x 3, appears mildly discomfortable.  HEENT:  Normocephalic/atraumatic, no scleral icterus, no nasal discharge, mouth moist.  CV:  Heart is with regular rate/rhythm. No murmurs,   No rubs. Normal S1 and S2. No S3, No S4   +JVD. Normal a and v waves and x and y descent seen.   Peripheral arteries:RA, FA, DP, PT all 2+/2+  no bruit on carotid arteries bilaterally, normal uptake of the both carotid arteries.  No bruit on abdomen or upper chest.  No lower extremities edema2+/2+  LUNGS:  Clear to auscultation bilaterally   ABD:  Active bowel sounds, soft, non-tender/non-distended.  No rebound/guarding/rigidity.  EXT:  No edema or cyanosis.  Hands/feet warm to touch with good signs of peripheral perfusion.   SKIN:  Dry to touch, no exanthems noted in the visualized areas.      Imaging Study  Data     EKG  NSR inferior stemi.      Echocardiogram:at bedside inferiorseptum and inferior wall akinesis.   Mild RV dilation. IVC dilated. Valves unable to assess.        Results for orders placed or performed during the hospital encounter of 07/17/19 (from the past 48 hour(s))   ISTAT INR POCT   Result Value  Ref Range    ISTAT INR 1.0 0.86 - 1.14   Creatinine POCT   Result Value Ref Range    Creatinine 1.4 (H) 0.66 - 1.25 mg/dL    GFR Estimate 51 (L) >60 mL/min/[1.73_m2]    GFR Estimate If Black 61 >60 mL/min/[1.73_m2]        Labs    Data     Metabolic Studies  Recent Labs   Lab 07/17/19  1912   GFRESTIMATED 51*   GFRESTBLACK 61       Anthony Kagn MD  Cardiology Fellow [phone]          ATTENDING NOTE:  Patient has been seen and evaluated by me on 07/17/2019. I have reviewed the H&P.  Please refer to it for additional details.  I have reviewed today's vital signs, medications, labs, and imaging results.  I have reviewed and edited, as necessary, the history, review of systems, physical examination, and assessment and plan.  I have discussed my assessment and plan with the cardiology fellow.  Yuri Louis is a 64 year old male with risk factor profile (+) HTN, (-) DM, (+) hypercholesterolemia, (+)  prior tobacco use, (-) fam Hx premature CAD, who presented to [facility] ER last night with 30 minutes of chest discomfort and marked ST elevation.  The EMS activated the cath lab.  The patient had intermittent bradycardia with HR dropping to 45 bpm.  He received ASA, Brilinta, and IV UFH.  He was brought to the cath lab for primary PCI.  I initially performed angiography via the RFA and found 100% thrombotic RCA with ANASTASIA-0 flow distally.  I performed aspiration thrombectomy with the Penumbra, restoring ANASTASIA-3 flow.  The patient had persistent ST elevation and I performed coronary angiography of the left system and found a 60-70% distal LMCA, 70% diffuse ostial/proximal LAD, 60% diffuse D1, 50% ostial LCx, and a 80% distal LCx.  PCI was performed with JUANJOSE on the distal LCx, the LMCA-LAD (single JUANJOSE), and then T-stent in the ostial LCx.  The patient had marked improvement in the ST elevation but the ST segments were still elevated at the completion of the procedure.  I gave him IV Integrelin infusion.  The RFA was  successfully closed with a Perclose.  Plan to admit to CCU and treat with low dose beta blocker, statin, ACE-inhibitor.  Hydrate given underlying CKD.  Exam documented above.    MD Kael Reyes MD     Cardiovascular Division

## 2019-07-18 NOTE — ED TRIAGE NOTES
Pt. BIBA from home for suspected R sided inferior MI per EMS. Pt. Is visiting family and is from the [facility] and speaks limited english. EMS reports BPs from 90s-115s systolic, HR 40s, sats high 90s on RA, placed on O2 for comfort. EMS placed 18 g IVs bilaterally and 2 L NS infusing to gravity. EMS reports decreased LOC but patient arouses to voice and light touch. EMS gave 324 ASA in route. Pt. Reports no medical hx except for HTN but takes no meds.  for EMS.

## 2019-07-18 NOTE — PROGRESS NOTES
"                              Cardiology Progress Note  Yuri Louis MRN: [id number]  Age: 64 year old, : 1955            Assessment and Plan:     63yo [place] male with no known past medical history who presented  with chest pain, SOB, AMS, found to have inferior STEMI. Brought emergently to the cath lab, where he was found to have 3V disease involving LM. S/p thrombectomy/JUANJOES pRCA (culprit), JUANJOSE ostial LCx, JUANJOSE distal LCx, JUANJOSE LM and ostial LAD. Cardiac risk factors include HTN, HLD, remote smoking hx. No personal history of DM or family history of CAD.    # Inferior STEMI  # 3V CAD involving LM  # HTN  # HLD  - s/p JUANJOSE x4 to RCA, LM/oLAD, oLCx, dLCx  - EKG this morning with NSR, minimal ST elevations in inferior leads, inferior Q waves  - ASA/ticagrelor  -- may need to consider changing to plavix pending finances  -- DAPT for minimum 18 months, possibly up to 3 years if no bleeding complications  - Finishing integrelin gtt, total run time 16 hours  - lisinopril 2.5mg  - lopressor 6.25mg BID  - atorvastatin 80mg  - cardiac MRI  - follow up Hemoglobin A1c  - encourage cardiac rehab, pending travel plans    FEN:  2gm Na   2L water restriction    PPX: ICDs    CODE: FULL     Patient was discussed with staff attending, Dr. Nina, who agrees with the above assessment and plan.    Hector Boyle MD  Cardiology Fellow, PGY-5  Pager: [phone]            Subjective/Interval Events     Admitted last night for inferior STEMI, s/p PCI last evening. Placed on Integrilin drip. HR down to 50s overnight, asymptomatic.     This morning           Objective     /75   Pulse 58   Temp 98  F (36.7  C) (Axillary)   Resp 22   Ht 1.626 m (5' 4\")   Wt 77 kg (169 lb 11.2 oz)   SpO2 98%   BMI 29.13 kg/m    Temp:  [97.4  F (36.3  C)-98  F (36.7  C)] 98  F (36.7  C)  Pulse:  [] 58  Heart Rate:  [55-87] 59  Resp:  [11-37] 22  BP: ()/(45-95) 109/75  SpO2:  [95 %-100 %] 98 %  Wt Readings from Last " 2 Encounters:   07/17/19 77 kg (169 lb 11.2 oz)     I/O last 3 completed shifts:  In: 191.02 [I.V.:191.02]  Out: 650 [Urine:450; Emesis/NG output:200]      Gen: No acute distress  HEENT: NC/AT, PERRL, EOM intact, MMM, OP without exudates  PULM/THORAX: Clear to auscultation bilaterally, no rales/rhonchi/wheezes  CV: RRR, normal S1 and S2, no murmurs or rubs. No JVD  ABD: Soft, NTND, bowel sounds present, no masses  EXT: WWP. No LE edema, clubbing or cyanosis.  NEURO: CN II-XII grossly intact. A&Ox3          Data:     Recent Results (from the past 24 hour(s))   Troponin POCT    Collection Time: 07/17/19  7:10 PM   Result Value Ref Range    Troponin I 0.00 0.00 - 0.08 ug/L   ISTAT INR POCT    Collection Time: 07/17/19  7:11 PM   Result Value Ref Range    ISTAT INR 1.0 0.86 - 1.14   CBC with platelets    Collection Time: 07/17/19  7:12 PM   Result Value Ref Range    WBC 14.3 (H) 4.0 - 11.0 10e9/L    RBC Count 4.30 (L) 4.4 - 5.9 10e12/L    Hemoglobin 13.4 13.3 - 17.7 g/dL    Hematocrit 41.5 40.0 - 53.0 %    MCV 97 78 - 100 fl    MCH 31.2 26.5 - 33.0 pg    MCHC 32.3 31.5 - 36.5 g/dL    RDW 12.2 10.0 - 15.0 %    Platelet Count 207 150 - 450 10e9/L   Creatinine POCT    Collection Time: 07/17/19  7:12 PM   Result Value Ref Range    Creatinine 1.4 (H) 0.66 - 1.25 mg/dL    GFR Estimate 51 (L) >60 mL/min/[1.73_m2]    GFR Estimate If Black 61 >60 mL/min/[1.73_m2]   INR    Collection Time: 07/17/19  7:12 PM   Result Value Ref Range    INR 1.07 0.86 - 1.14   Comprehensive metabolic panel    Collection Time: 07/17/19  7:12 PM   Result Value Ref Range    Sodium 140 133 - 144 mmol/L    Potassium 2.8 (L) 3.4 - 5.3 mmol/L    Chloride 108 94 - 109 mmol/L    Carbon Dioxide 24 20 - 32 mmol/L    Anion Gap 9 3 - 14 mmol/L    Glucose 133 (H) 70 - 99 mg/dL    Urea Nitrogen 17 7 - 30 mg/dL    Creatinine 1.38 (H) 0.66 - 1.25 mg/dL    GFR Estimate 53 (L) >60 mL/min/[1.73_m2]    GFR Estimate If Black 61 >60 mL/min/[1.73_m2]    Calcium 7.4 (L) 8.5  - 10.1 mg/dL    Bilirubin Total 0.5 0.2 - 1.3 mg/dL    Albumin 3.0 (L) 3.4 - 5.0 g/dL    Protein Total 6.4 (L) 6.8 - 8.8 g/dL    Alkaline Phosphatase 80 40 - 150 U/L    ALT 25 0 - 70 U/L    AST 21 0 - 45 U/L   Troponin I    Collection Time: 07/17/19  7:12 PM   Result Value Ref Range    Troponin I ES <0.015 0.000 - 0.045 ug/L   Nt probnp inpatient (BNP)    Collection Time: 07/17/19  7:12 PM   Result Value Ref Range    N-Terminal Pro BNP Inpatient 76 0 - 900 pg/mL   Activated clotting time POCT    Collection Time: 07/17/19  7:45 PM   Result Value Ref Range    Activated Clot Time 255 (H) 75 - 150 sec   Activated clotting time POCT    Collection Time: 07/17/19  7:58 PM   Result Value Ref Range    Activated Clot Time 231 (H) 75 - 150 sec   ISTAT gases elec ica gluc art POCT    Collection Time: 07/17/19  7:59 PM   Result Value Ref Range    pH Arterial 7.27 (L) 7.35 - 7.45 pH    pCO2 Arterial 49 (H) 35 - 45 mm Hg    pO2 Arterial 166 (H) 80 - 105 mm Hg    Bicarbonate Arterial 22 21 - 28 mmol/L    O2 Sat Arterial 99 92 - 100 %    Sodium 142 133 - 144 mmol/L    Potassium 2.9 (L) 3.4 - 5.3 mmol/L    Glucose 135 (H) 70 - 99 mg/dL    Calcium Ionized 4.0 (L) 4.4 - 5.2 mg/dL    Hemoglobin 12.2 (L) 13.3 - 17.7 g/dL    Hematocrit - POCT 36 (L) 40.0 - 53.0 %PCV   Activated clotting time POCT    Collection Time: 07/17/19  8:17 PM   Result Value Ref Range    Activated Clot Time 384 (H) 75 - 150 sec   Activated clotting time POCT    Collection Time: 07/17/19  8:36 PM   Result Value Ref Range    Activated Clot Time 360 (H) 75 - 150 sec   EKG 12-lead, tracing only    Collection Time: 07/17/19  9:16 PM   Result Value Ref Range    Interpretation ECG Click View Image link to view waveform and result    EKG 12-lead, tracing only     Collection Time: 07/17/19 11:01 PM   Result Value Ref Range    Interpretation ECG Click View Image link to view waveform and result    Lipid Profile    Collection Time: 07/17/19 11:44 PM   Result Value Ref Range     Cholesterol 215 (H) <200 mg/dL    Triglycerides 83 <150 mg/dL    HDL Cholesterol 40 >39 mg/dL    LDL Cholesterol Calculated 158 (H) <100 mg/dL    Non HDL Cholesterol 175 (H) <130 mg/dL   Troponin I    Collection Time: 07/17/19 11:44 PM   Result Value Ref Range    Troponin I ES 35.734 (HH) 0.000 - 0.045 ug/L   Glucose by meter    Collection Time: 07/18/19 12:01 AM   Result Value Ref Range    Glucose 132 (H) 70 - 99 mg/dL   Basic metabolic panel    Collection Time: 07/18/19  4:15 AM   Result Value Ref Range    Sodium 140 133 - 144 mmol/L    Potassium 4.7 3.4 - 5.3 mmol/L    Chloride 108 94 - 109 mmol/L    Carbon Dioxide 22 20 - 32 mmol/L    Anion Gap 10 3 - 14 mmol/L    Glucose 125 (H) 70 - 99 mg/dL    Urea Nitrogen 19 7 - 30 mg/dL    Creatinine 1.33 (H) 0.66 - 1.25 mg/dL    GFR Estimate 55 (L) >60 mL/min/[1.73_m2]    GFR Estimate If Black 64 >60 mL/min/[1.73_m2]    Calcium 7.8 (L) 8.5 - 10.1 mg/dL   CBC with platelets    Collection Time: 07/18/19  4:15 AM   Result Value Ref Range    WBC 16.0 (H) 4.0 - 11.0 10e9/L    RBC Count 4.60 4.4 - 5.9 10e12/L    Hemoglobin 14.6 13.3 - 17.7 g/dL    Hematocrit 46.0 40.0 - 53.0 %     78 - 100 fl    MCH 31.7 26.5 - 33.0 pg    MCHC 31.7 31.5 - 36.5 g/dL    RDW 12.6 10.0 - 15.0 %    Platelet Count 215 150 - 450 10e9/L   Troponin I    Collection Time: 07/18/19  4:15 AM   Result Value Ref Range    Troponin I ES 59.828 (HH) 0.000 - 0.045 ug/L       Recent Results (from the past 24 hour(s))   POC US ECHO LIMITED    Impression    Limited Bedside Cardiac Ultrasound, performed and interpreted by me.   Indication: Chest Pain.  Parasternal long axis, parasternal short axis, apical 4 chamber and subcostal views were acquired.   Image quality was satisfactory.    Findings:    Abnormal RV wall motion abnormality, hypertrophic appearing LV    IMPRESSION: Abnormal limited cardiac ultrasound showing RV wall motion abnormality, hypertrophic appearing LV  .  No pericardial effusion.              Medications     Current Facility-Administered Medications   Medication     0.9% sodium chloride BOLUS     aspirin EC tablet 81 mg     atorvastatin (LIPITOR) tablet 80 mg     atropine injection 0.5 mg     eptifibatide (INTEGRILIN) 200 mg/100 mL infusion     fentaNYL (PF) (SUBLIMAZE) injection 25-50 mcg     flumazenil (ROMAZICON) injection 0.2 mg     lisinopril (PRINIVIL/Zestril) tablet 2.5 mg     magnesium sulfate 4 g in 100 mL sterile water (premade)     Medication Considerations - To maintain platelet inhibition after discontinuation of cangrelor (KENGREAL) [see admin instructions]     metoprolol tartrate (LOPRESSOR) quarter-tab 6.25 mg     midazolam (VERSED) injection 0.5-1 mg     naloxone (NARCAN) injection 0.1-0.4 mg     naloxone (NARCAN) injection 0.2-0.4 mg     nitroGLYcerin (NITROSTAT) sublingual tablet 0.4 mg     ondansetron (ZOFRAN) injection 4 mg     Percutaneous Coronary Intervention orders placed (this is information for BPA alerting)     potassium chloride (KLOR-CON) Packet 20-40 mEq     potassium chloride 10 mEq in 100 mL intermittent infusion with 10 mg lidocaine     potassium chloride 10 mEq in 100 mL sterile water intermittent infusion (premix)     potassium chloride 20 mEq in 50 mL intermittent infusion     potassium chloride ER (K-DUR/KLOR-CON M) CR tablet 20-40 mEq     ticagrelor (BRILINTA) tablet 90 mg                     ATTENDING NOTE:  Patient has been seen and evaluated by me on 07/18/2019. I have reviewed today's vital signs, medications, labs, and imaging results.  I have reviewed and edited, as necessary, the history, review of systems, physical examination, and assessment and plan.  I have discussed my assessment and plan with the cardiology fellow.  Yuri Louis is a 64 year old male with risk factor profile (+) HTN, (-) DM, (+) hypercholesterolemia, (+)  prior tobacco use, (-) fam Hx premature CAD, who presented to [facility] ER last night with 30 minutes of chest discomfort  and marked ST elevation.  The EMS activated the cath lab.  The patient had intermittent bradycardia with HR dropping to 45 bpm.  He received ASA, Brilinta, and IV UFH.  He was brought to the cath lab for primary PCI.  I initially performed angiography via the RFA and found 100% thrombotic RCA with ANASTASIA-0 flow distally.  I performed aspiration thrombectomy with the Penumbra, restoring ANASTASIA-3 flow.  The patient had persistent ST elevation and I performed coronary angiography of the left system and found a 60-70% distal LMCA, 70% diffuse ostial/proximal LAD, 60% diffuse D1, 50% ostial LCx, and a 80% distal LCx.  PCI was performed with JUANJOSE on the distal LCx, the LMCA-LAD (single JUANJOSE), and then T-stent in the ostial LCx.  The patient had marked improvement in the ST elevation but the ST segments were still elevated at the completion of the procedure.  I gave him IV Integrelin infusion.  The RFA was successfully closed with a Perclose.  The patient was admitted to CCU and had an uneventful night other than nausea and a single episode of emesis.  He is chest pain free this morning.  Cardiovascular exam unremarkable.  RFA site healing well, no hematoma.   He is on low dose beta blocker, statin, ACE-inhibitor.  Exam documented above.  ECG shows NSR with Q waves inferiorly and minimal ST elevation inferiorly.      Lab Results   Component Value Date    TROPI 59.828 () 07/18/2019    TROPI 35.734 (HH) 07/17/2019    TROPI <0.015 07/17/2019    TROPONIN 0.00 07/17/2019     Transfer to telemetry today and monitor overnight.     Kael Nina MD     Cardiovascular Division

## 2019-07-18 NOTE — ED NOTES
Ipad  @ bedside during consent, pt. now being transferred to the cath lab with cath RN and MD damon.

## 2019-07-18 NOTE — PLAN OF CARE
VSS overnight, voiding well in urinal. No bleeding from groin site. Groin and abdomen soft to touch with +2 distal pulses. No chest pain noted overnight. Pt vomited x 2, Zofran given with good results. No other issues noted, Will continue to monitor and address as needed.

## 2019-07-18 NOTE — PROGRESS NOTES
Family education completed:No    Report given to: ROGER Melendez ([place])    Time of transfer:1345    Transferred to: 119-1  Belongings sent:no belongings present    Family updated:are present and can ambulate up with patient in WC    Reviewed pertinent information from EPIC (EMAR/Clinical Summary/Flowsheets):Yes    Head-to-toe assessment with receiving RN:No    Recommendations (e.g. Family needs/recent issues/things to watch for): not currently

## 2019-07-18 NOTE — PLAN OF CARE
Hours cared for: [id number]    D: STEMI; Hx of HLD, HTN.     I/A: A&Ox4, VSS, denies pain. Room air. SR. Poor appetite-ate approx 25% of soup and crackers. Denies N/V. Last BM 7/16/19. Void x1 this shift-encouraged to use urinal for measurements. Right groin site-tegaderm in place, ecchymosis.    P: Continue to follow POC and contact [facility] with questions or concerns.

## 2019-07-19 ENCOUNTER — PATIENT OUTREACH (OUTPATIENT)
Dept: CARE COORDINATION | Facility: CLINIC | Age: 64
End: 2019-07-19

## 2019-07-19 VITALS
HEART RATE: 68 BPM | BODY MASS INDEX: 27.69 KG/M2 | HEIGHT: 64 IN | TEMPERATURE: 98.2 F | DIASTOLIC BLOOD PRESSURE: 90 MMHG | SYSTOLIC BLOOD PRESSURE: 125 MMHG | RESPIRATION RATE: 16 BRPM | OXYGEN SATURATION: 97 % | WEIGHT: 162.2 LBS

## 2019-07-19 LAB
ANION GAP SERPL CALCULATED.3IONS-SCNC: 5 MMOL/L (ref 3–14)
BASOPHILS # BLD AUTO: 0 10E9/L (ref 0–0.2)
BASOPHILS NFR BLD AUTO: 0.1 %
BUN SERPL-MCNC: 18 MG/DL (ref 7–30)
CALCIUM SERPL-MCNC: 7.9 MG/DL (ref 8.5–10.1)
CHLORIDE SERPL-SCNC: 106 MMOL/L (ref 94–109)
CO2 SERPL-SCNC: 28 MMOL/L (ref 20–32)
CREAT SERPL-MCNC: 1.03 MG/DL (ref 0.66–1.25)
DIFFERENTIAL METHOD BLD: ABNORMAL
EOSINOPHIL # BLD AUTO: 0 10E9/L (ref 0–0.7)
EOSINOPHIL NFR BLD AUTO: 0.1 %
ERYTHROCYTE [DISTWIDTH] IN BLOOD BY AUTOMATED COUNT: 12.8 % (ref 10–15)
GFR SERPL CREATININE-BSD FRML MDRD: 75 ML/MIN/{1.73_M2}
GLUCOSE SERPL-MCNC: 132 MG/DL (ref 70–99)
HCT VFR BLD AUTO: 41.5 % (ref 40–53)
HCT VFR BLD AUTO: 43.5 % (ref 40–53)
HGB BLD-MCNC: 13.4 G/DL (ref 13.3–17.7)
HGB BLD-MCNC: 13.8 G/DL (ref 13.3–17.7)
IMM GRANULOCYTES # BLD: 0.1 10E9/L (ref 0–0.4)
IMM GRANULOCYTES NFR BLD: 0.4 %
LMWH PPP CHRO-ACNC: <0.1 IU/ML
LYMPHOCYTES # BLD AUTO: 2.6 10E9/L (ref 0.8–5.3)
LYMPHOCYTES NFR BLD AUTO: 17.9 %
MCH RBC QN AUTO: 31.3 PG (ref 26.5–33)
MCHC RBC AUTO-ENTMCNC: 32.3 G/DL (ref 31.5–36.5)
MCV RBC AUTO: 97 FL (ref 78–100)
MONOCYTES # BLD AUTO: 1.2 10E9/L (ref 0–1.3)
MONOCYTES NFR BLD AUTO: 8.2 %
NEUTROPHILS # BLD AUTO: 10.8 10E9/L (ref 1.6–8.3)
NEUTROPHILS NFR BLD AUTO: 73.3 %
NRBC # BLD AUTO: 0 10*3/UL
NRBC BLD AUTO-RTO: 0 /100
PLATELET # BLD AUTO: 182 10E9/L (ref 150–450)
PLATELET # BLD AUTO: 186 10E9/L (ref 150–450)
POTASSIUM SERPL-SCNC: 3.4 MMOL/L (ref 3.4–5.3)
RBC # BLD AUTO: 4.28 10E12/L (ref 4.4–5.9)
SODIUM SERPL-SCNC: 138 MMOL/L (ref 133–144)
TROPONIN I SERPL-MCNC: 32.01 UG/L (ref 0–0.04)
WBC # BLD AUTO: 14.7 10E9/L (ref 4–11)

## 2019-07-19 PROCEDURE — 85027 COMPLETE CBC AUTOMATED: CPT | Performed by: EMERGENCY MEDICINE

## 2019-07-19 PROCEDURE — 36415 COLL VENOUS BLD VENIPUNCTURE: CPT | Performed by: EMERGENCY MEDICINE

## 2019-07-19 PROCEDURE — 36415 COLL VENOUS BLD VENIPUNCTURE: CPT | Performed by: PHYSICIAN ASSISTANT

## 2019-07-19 PROCEDURE — 84484 ASSAY OF TROPONIN QUANT: CPT | Performed by: PHYSICIAN ASSISTANT

## 2019-07-19 PROCEDURE — 25000132 ZZH RX MED GY IP 250 OP 250 PS 637: Performed by: INTERNAL MEDICINE

## 2019-07-19 PROCEDURE — 80048 BASIC METABOLIC PNL TOTAL CA: CPT | Performed by: PHYSICIAN ASSISTANT

## 2019-07-19 PROCEDURE — 25000132 ZZH RX MED GY IP 250 OP 250 PS 637: Performed by: PHYSICIAN ASSISTANT

## 2019-07-19 PROCEDURE — 99239 HOSP IP/OBS DSCHRG MGMT >30: CPT | Performed by: INTERNAL MEDICINE

## 2019-07-19 PROCEDURE — 85520 HEPARIN ASSAY: CPT | Performed by: EMERGENCY MEDICINE

## 2019-07-19 PROCEDURE — 99207 ZZC NO CHARGE LOS: CPT | Performed by: INTERNAL MEDICINE

## 2019-07-19 PROCEDURE — 85025 COMPLETE CBC W/AUTO DIFF WBC: CPT | Performed by: PHYSICIAN ASSISTANT

## 2019-07-19 RX ORDER — NITROGLYCERIN 0.4 MG/1
TABLET SUBLINGUAL
Qty: 30 TABLET | Refills: 0 | Status: SHIPPED | OUTPATIENT
Start: 2019-07-19

## 2019-07-19 RX ORDER — ATORVASTATIN CALCIUM 80 MG/1
80 TABLET, FILM COATED ORAL DAILY
Qty: 120 TABLET | Refills: 0 | Status: SHIPPED | OUTPATIENT
Start: 2019-07-20

## 2019-07-19 RX ORDER — LISINOPRIL 5 MG/1
5 TABLET ORAL DAILY
Qty: 120 TABLET | Refills: 0 | Status: SHIPPED | OUTPATIENT
Start: 2019-07-20 | End: 2019-08-06

## 2019-07-19 RX ORDER — LISINOPRIL 5 MG/1
5 TABLET ORAL DAILY
Status: DISCONTINUED | OUTPATIENT
Start: 2019-07-19 | End: 2019-07-19 | Stop reason: HOSPADM

## 2019-07-19 RX ORDER — CLOPIDOGREL BISULFATE 75 MG/1
75 TABLET ORAL DAILY
Qty: 120 TABLET | Refills: 0 | Status: SHIPPED | OUTPATIENT
Start: 2019-07-19

## 2019-07-19 RX ORDER — METOPROLOL TARTRATE 25 MG/1
25 TABLET, FILM COATED ORAL 2 TIMES DAILY
Status: DISCONTINUED | OUTPATIENT
Start: 2019-07-19 | End: 2019-07-19 | Stop reason: HOSPADM

## 2019-07-19 RX ORDER — METOPROLOL SUCCINATE 50 MG/1
50 TABLET, EXTENDED RELEASE ORAL DAILY
Qty: 120 TABLET | Refills: 0 | Status: SHIPPED | OUTPATIENT
Start: 2019-07-19

## 2019-07-19 RX ADMIN — ATORVASTATIN CALCIUM 80 MG: 80 TABLET, FILM COATED ORAL at 08:59

## 2019-07-19 RX ADMIN — METOPROLOL TARTRATE 25 MG: 25 TABLET ORAL at 08:59

## 2019-07-19 RX ADMIN — ASPIRIN 81 MG: 81 TABLET, COATED ORAL at 08:59

## 2019-07-19 RX ADMIN — TICAGRELOR 90 MG: 90 TABLET ORAL at 08:59

## 2019-07-19 RX ADMIN — ACETAMINOPHEN 325 MG: 325 TABLET, FILM COATED ORAL at 02:25

## 2019-07-19 RX ADMIN — POTASSIUM CHLORIDE 20 MEQ: 10 TABLET, EXTENDED RELEASE ORAL at 08:59

## 2019-07-19 RX ADMIN — LISINOPRIL 5 MG: 5 TABLET ORAL at 08:59

## 2019-07-19 ASSESSMENT — ACTIVITIES OF DAILY LIVING (ADL)
ADLS_ACUITY_SCORE: 10

## 2019-07-19 ASSESSMENT — MIFFLIN-ST. JEOR: SCORE: 1426.73

## 2019-07-19 ASSESSMENT — PAIN DESCRIPTION - DESCRIPTORS: DESCRIPTORS: PATIENT UNABLE TO DESCRIBE

## 2019-07-19 NOTE — DISCHARGE SUMMARY
[facility]   Cardiology Discharge Summary      Date of Admission:  7/17/2019  Date of Discharge:  7/19/2019   Discharging Provider: Patricia Diallo  Date of Service: 7/19/2019     Primary Care     No Ref-Primary, Physician  No address on file      Identification and Chief Complaint: Yuri Louis is a 64 year old male who presented on 7/17/2019 with complaint of chest pain.    Discharge Diagnoses     ST elevation myocardial infarction (STEMI) of inferior wall (H)    Discharge Disposition   Discharged to home    Discharge Orders      MRI Cardiac w/contrast     Follow-Up with Cardiac Advanced Practice Provider      CARDIAC REHAB REFERRAL      Cardiac Rehab Referral      Reason for your hospital stay    STEMI     Follow Up and recommended labs and tests    Cardiology, 1 week     Activity    Your activity upon discharge: activity as tolerated     Full Code     Diet    Follow this diet upon discharge: Orders Placed This Encounter      Room Service      2 Gram Sodium Diet     Discharge Medications   Current Discharge Medication List      START taking these medications    Details   aspirin (ASA) 81 MG EC tablet Take 1 tablet (81 mg) by mouth daily    Associated Diagnoses: ST elevation myocardial infarction (STEMI) of inferior wall (H)      atorvastatin (LIPITOR) 80 MG tablet Take 1 tablet (80 mg) by mouth daily  Qty: 120 tablet, Refills: 0    Associated Diagnoses: ST elevation myocardial infarction (STEMI) of inferior wall (H)      clopidogrel (PLAVIX) 75 MG tablet Take 1 tablet (75 mg) by mouth daily  Qty: 120 tablet, Refills: 0    Associated Diagnoses: ST elevation myocardial infarction (STEMI) of inferior wall (H)      lisinopril (PRINIVIL/ZESTRIL) 5 MG tablet Take 1 tablet (5 mg) by mouth daily  Qty: 120 tablet, Refills: 0    Associated Diagnoses: ST elevation myocardial infarction (STEMI) of inferior wall (H)      metoprolol succinate ER (TOPROL-XL) 50 MG 24 hr tablet Take 1  tablet (50 mg) by mouth daily  Qty: 120 tablet, Refills: 0    Associated Diagnoses: ST elevation myocardial infarction (STEMI) of inferior wall (H)      nitroGLYcerin (NITROSTAT) 0.4 MG sublingual tablet For chest pain place 1 tablet under the tongue every 5 minutes for 3 doses. If symptoms persist 5 minutes after 1st dose call 911.  Qty: 30 tablet, Refills: 0    Associated Diagnoses: ST elevation myocardial infarction (STEMI) of inferior wall (H)           Allergies   No Known Allergies    Consultations This Hospital Stay  NUTRITION SERVICES ADULT IP CONSULT  PHARMACY IP CONSULT  PHARMACY IP CONSULT  SMOKING CESSATION PROGRAM IP CONSULT    Significant Results and Procedures     Coronary Angiogram 2019    Data   Results for orders placed or performed during the hospital encounter of 19   POC US ECHO LIMITED    Impression    Limited Bedside Cardiac Ultrasound, performed and interpreted by me.   Indication: Chest Pain.  Parasternal long axis, parasternal short axis, apical 4 chamber and subcostal views were acquired.   Image quality was satisfactory.    Findings:    Abnormal RV wall motion abnormality, hypertrophic appearing LV    IMPRESSION: Abnormal limited cardiac ultrasound showing RV wall motion abnormality, hypertrophic appearing LV  .  No pericardial effusion.   MRI Cardiac w/contrast    Narrative                                                  [facility]                                                     CMR Report      MRN:                   [id number]                                  Name:        ANTOINEMICHAELKATHERINE HOFFMAN                                  :                   1955                                  Scan Date:    2019 11:04:51                                  Electronically signed by Day Hanley  12:22:24    SUMMARY   ==========================================================================================================    Clinical history: 64 year  old man with recent inferior STEMI, severe 3 vessel coronary disease who received  multiple PCI. cMR to assess function.    Comparison CMR: none    1. The left ventricle is normal in cavity size with mild concentric remodeling. The global systolic  function is normal. The LVEF is 73 %. There are no regional wall motion abnormalities.    2. The right ventricle is mildly dilated on visual assessment. The global systolic function is moderately  reduced. The RVEF is 31%.     3. The right atrium is moderately enlarged and the left atrium is normal in size.    4. There is tricuspid regurgitation.     5. There is subendocardial late gadolinium enhancement in the basal inferoseptal and basal-distal inferior  segments consistent with myocardial infarction in the right coronary artery territory. Overall scar burden  6%.     6. There is no pericardial effusion or thickening.    7. There is no intracardiac thrombus.    8. There is no myocardial edema.    CONCLUSIONS:   Ischemic heart disease with myocardial infarction in the right coronary artery territory.   Normal left ventricular function, LVEF 73% and moderately reduced right ventricular function, RVEF 31%.    CORE EXAM   ==========================================================================================================    MEASUREMENTS   ----------------------------------------------------------------------------------------      VOLUMETRIC ANALYSIS       ----------------------------------------------  .----------------------------------------------------------.                    LV    Reference   RV    Reference    +------+-----------+------+------------+------+------------+   EDV   ml          125   (109-191)   156   (105-205)          ml/m^2     68.7   (60-95)    85.7   ()     ESV   ml           34   (27-72)     107   (20-80)            ml/m^2     18.7   (14-36)    58.8   (11-40)      CO    L/min      5.46               2.94                      L/min/m^2   3.0               1.6                MASS  g           138   (107-183)                            g/m^2      75.8   (57-90)                        SV    ml           91   ()     49   ()           ml/m^2     50.0   (40-64)    26.9   (37-69)      EF    %            73   (58-76)      31   (55-81)     '------+-----------+------+------------+------+------------'            CARDIAC OUTPUT HR:  60 BPM      LV DIMENSIONS       ----------------------------------------------          WALL THICKNESS - MAXIMUM:  1.3 cm      ANATOMY   ----------------------------------------------------------------------------------------      LEFT VENTRICLE       ----------------------------------------------          WALL THICKNESS:  Normal        RIGHT VENTRICLE       ----------------------------------------------          WALL THICKNESS:  Normal          CONTRACTILITY:  MODERATE GLOBALLY DECREASED          CAVITY SIZE:  MILDLY ENLARGED          MASS/THROMBUS:  None        LEFT ATRIUM       ----------------------------------------------          CAVITY SIZE:  Normal        RIGHT ATRIUM       ----------------------------------------------          CAVITY SIZE:  MODERATELY ENLARGED        PERICARDIUM       ----------------------------------------------          Normal          EFFUSION:  None        PLEURAL EFFUSION       ----------------------------------------------   None       VALVES   ----------------------------------------------------------------------------------------      AORTIC VALVE       ----------------------------------------------          AORTIC VALVE LEAFLETS:  Trileaflet          AORTIC REGURGITATION:  Present        MITRAL VALVE       ----------------------------------------------          MITRAL VALVE LEAFLETS:  Normal Leaflets        TRICUSPID VALVE       ----------------------------------------------          TRICUSPID  REGURGITATION:  Present        PULMONIC VALVE       ----------------------------------------------          PULMONIC VALVE LEAFLETS:  Normal Leaflets      17 SEGMENT   ----------------------------------------------------------------------------------------  .-------------------------------------------------------------------------------------------------.   Segments            Wall Motion    Hyperenhancement  Stress Perfusion  Interpretation         +--------------------+---------------+------------------+------------------+----------------------+   Base Anterior       Normal/Hyper   None              Normal            Normal                  Base Anteroseptal   Normal/Hyper   None              Normal            Normal                  Base Inferoseptal   Normal/Hyper   1-25%             Normal            Sub-Endo MI             Base Inferior       Normal/Hyper   26-50%            Normal            Sub-Endo MI             Base Inferolateral  Normal/Hyper   None              Normal            Normal                  Base Anterolateral  Normal/Hyper   None              Normal            Normal                  Mid Anterior        Normal/Hyper   None              Normal            Normal                  Mid Anteroseptal    Normal/Hyper   None              Normal            Normal                  Mid Inferoseptal    Normal/Hyper   None              Normal            Normal                  Mid Inferior        Normal/Hyper   26-50%            Normal            Sub-Endo MI             Mid Inferolateral   Normal/Hyper   None              Normal            Normal                  Mid Anterolateral   Normal/Hyper   None              Normal            Normal                  Apical Anterior     Normal/Hyper   None              Normal            Normal                  Apical Septal       Normal/Hyper   None              Normal             Normal                  Apical Inferior     Normal/Hyper   1-25%             Normal            Sub-Endo MI             Apical Lateral      Normal/Hyper   None              Normal            Normal                  [place]                Normal/Hyper   None              Normal            Normal                 +--------------------+---------------+------------------+------------------+----------------------+   RV Segments         Wall Motion    Hyperenhancement  Stress Perfusion  Interpretation         +--------------------+---------------+------------------+------------------+----------------------+   RV Basal Anterior   Mild/Mod Hypo  None              Normal            Abnormal Wall Motion    RV Basal Inferior   Mild/Mod Hypo  None              Normal            Abnormal Wall Motion    RV Mid              Mild/Mod Hypo  None              Normal            Abnormal Wall Motion    RV Apical           Mild/Mod Hypo  None              Normal            Abnormal Wall Motion   '--------------------+---------------+------------------+------------------+----------------------'        FINDINGS       ----------------------------------------------          INFARCT/SCAR SIZE:  6 %      SCAN INFO   ==========================================================================================================    GENERAL   ----------------------------------------------------------------------------------------      CONTRAST AGENT       ----------------------------------------------          TYPE:  Gadavist          VOLUME ADMINISTERED:  10 ml          DOSAGE FOR 0.5M:  0.07 mmol/kg        SEDATION       ----------------------------------------------          SEDATION USED?:  No        VITALS       ----------------------------------------------          HEIGHT:  64.00 in          HEIGHT:  162.56 cm          WEIGHT:  169.01 lbs          WEIGHT:  76.66 kgs          BSA:  1.82 m^2         PULSE SEQUENCES       ----------------------------------------------          Single-Shot SSFP, IR GRE - Segmented, IR SSFP - Single Shot, SSFP Cine, SR GRE Perfusion         SETUP       ----------------------------------------------          TYPE:  Clinical          INCOMPLETE SCAN:  No          REASON(S) FOR SCAN:  Congenital Heart Disease           ATTENDING PHYSICIAN:  JENNIFER POLANCO      Report generated by [employer], a product of [employer]       History of Present Illness   (From H&P) Yuri Louis is a 64 year old [place] male who presented to the Emergency Department today for evaluation of chest pain, shortness of breath, and altered mental status. The patient is visiting from the [facility]. History is provided by EMS. EMS reports that about 45 minutes prior to arrival to the ED, the patient began having chest pain and shortness of breath. EMS was called and upon their arrival the patient was noted to be altered. However, on arrival to ED Pt He continues to be altered here in the ED.      Family reported to EMS that the patient did not have any cardiac history.      In ED, ticagrelor 180mg ans  mg loaded. Heparin started.  Cath lab activated. % thrombotic occlusion at proximal site where BMS placed.LM, LCX, LAD stented as well.    Hospital Course   Yuri Louis was admitted on 7/17/2019.  The following problems were addressed during his hospitalization:    # Inferior STEMI  # 3V CAD involving LM  # HTN  # HLD  Presented with chest pain, SOB, and AMS.  Found to have inferior ST segment elevation MI.  Emergently sent to the cath lab; RCA found to be culprit lesion.  Patient underwent JUANJOSE x4 to RCA, LM/oLAD, oLCx, dLCx. EKG s/p angiogram was NSR, minimal ST elevations in inferior leads, inferior Q waves. Finished artaculous gtt, total run time 16 hours.  A1c within normal limits. Lipid panel with cholesterol of 215, LDL of 158.  Troponin peak 83.  Cardiac MRI showed  LVEF of 73%, RVEF of 31% with late gadolinium enhance of the basal inferoseptal, basal-distal inferior segment consistent with RCA infarct.  He will follow up with cardiology here in approximately 1 week.  He will initiate cardiac rehab prior to returning to the [facility].  He will follow up with cardiology there.  Patient is returning [facility] in approximately 1 month. He was given a 3 month supply of all medications on discharge (on GDMT).  - DAPT: ASA/Plavix (will reload with 300mg this evening, then 75mg daily as patient was on Brilinta while admitted); minimum 18 months, possibly up to 3 years if no bleeding complications  - ACE: lisinopril 5mg  - BB: lopressor 25mg BID  - Statin: atorvastatin 80mg    Pending Results   Unresulted Labs Ordered in the Past 30 Days of this Admission     No orders found from 6/17/2019 to 7/18/2019.          Physical Exam   Temp:  [98.2  F (36.8  C)-99.1  F (37.3  C)] 98.2  F (36.8  C)  Pulse:  [59-68] 68  Heart Rate:  [71-90] 77  Resp:  [16-18] 16  BP: (121-159)/(69-90) 140/84  SpO2:  [94 %-97 %] 97 %  Vitals:    07/17/19 1912 07/17/19 1915 07/19/19 0230   Weight: 77 kg (169 lb 11.2 oz) 77 kg (169 lb 11.2 oz) 73.6 kg (162 lb 3.2 oz)     Constitutional: alert and oriented x4, in no acute distress  CV: Regular rate/rhythm. No apprecaible murmur, rub, gallop  Respiratory: CTA bilaterally  GI: Soft, nontender  Skin: Warm and dry  Right Groin: ecchymotic.  No apprecaible hematoma    The discharge plan was discussed with the patient and patient agrees to plan.  Of note, son was used as .    Total time on this discharge was greater than 30 minutes.    Patricia Diallo PA-C  Cardiology - [facility]     I have seen and examined the patient and agree with the finding and plan.       Jonn Hamlin MD  Cardiology-[facility]  [phone]

## 2019-07-19 NOTE — DISCHARGE INSTRUCTIONS
1. Please take 300mg (4 tablets) of Plavix this evening.  Then being 75mg Plavix once daily tomorrow morning.  2. Please purchase and begin taking 81mg aspirin daily  3. Please start 5mg lisinopril daily  4. Please start 50mg metoprolol XL daily  5. Please start 80mg atorvastatin daily.  6. We have given you some nitroglycerin pills.  These are ONLY to be used if you have chest pain. You may take 1 pill every 5 minutes for up to 3 pills.  If you pain persists despite 1 pill, call 911  7. Please be seen in cardiology clinic in approximately 1 week  8. Please start cardiac rehab if able.    Going Home after Coronary Stent Placement     PROCEDURE SITE:  Care of groin site:         Remove the Band-Aid after 24 hours. If there is minor oozing, apply another Band-aid and remove it after 12 hours.          Do NOT take a bath, or use a hot tub or pool for at least 3 days. You may shower.          It is normal to have a small bruise or lump at the site.         Do not scrub the site.         Do not use lotion or powder near the puncture site for 3 days.         For the first 2 days: Do not stoop or squat. When you cough, sneeze or move your bowels, hold your hand over the puncture site and press gently.         Do not lift more than 10 pounds for at least 3 to 5 days.         For 2 days, do NOT have sex or do any heavy exercise.     If you start bleeding from the site in your groin:  Lie down flat and press firmly on the site.  Call your physician immediately, or, come to the emergency room.    Call 911 right away if you have bleeding that is heavy or does not stop.      MEDICATIONS:   1. You have begun Plavix (clopidogrel) do not stop taking it until you talk to your heart doctor (cardiologist). This medication is essential for your stents. Do not stop it without speaking first to your heart doctor or their nurse- this includes if you have concerns about side effects or cost. Also, if another health provider tells you to  stop it, let them know they need to discuss it with your heart doctor.     CARDIAC REHAB:  After the initial healing process of the procedure site, we recommend cardiac rehabilitation for all heart attack and stent patients. Cardiac rehabilitation will help you:  - Rebuild stamina, strength and balance.  - Learn how to participate in activities safely, as well as help you regain confidence to do so.  - Return to activities of daily living and leisure.  You should receive a call from them within the next week, but if you do not or you would like to call you can contact their central scheduling at [phone]    DIET:  We recommend a diet low in saturated fat, trans fat and cholesterol. In addition it will be helpful to be cautious of sodium intake, sugar and carbohydrates. Try to increase the amount of lean meats you eat like fish and chicken, but avoid frying; and reduce the amount of red meat you eat. Eat more fresh fruits and vegetables and try to avoid canned and processed food. Please reference the handouts you received for more specific information.    CALL YOUR DOCTOR IF:  -You have a large or growing lump/bump around the procedure site  -The site is red, swollen, hot, tender or has drainage  -You have hives, a rash or unusual itching  -You have increasing or worsening shortness of breath or chest pain    FOLLOW UP:  We prefer you to follow up with your primary care provider within one week. You will be arranged to see the cardiologist (heart doctor) in clinic in about one month.     Should you need to contact us:  Cardiology clinic for scheduling or triage nurse questions/concerns:  [phone]

## 2019-07-19 NOTE — PROGRESS NOTES
DISCHARGE                      1/13/19  ----------------------------------------------------------------------------  Discharged to: Son's home in [place], [place]  Via: Automobile  Accompanied by: Spouse and son    Discharge Instructions: Reviewed post-hospital discharge diet, activity, and medication instructions, as well as reasons to contact MD (ie. Shortness of breath, chest pain, etc). Pt verbalized understanding.    Prescriptions: Rx's were filled at [facility], per pt's request; medication list thoroughly reviewed providing indications for each medication as well as specific instructions on how to take nitroglycerin, should pt have chest pain; All questions answered, list sent with pt.    Follow Up Appointments: Patient aware he needs to make a follow up appointment with PCP within the week as well as an appointment to follow up with cardiology; cardiology clinic phone number written on discharge instructions.     Belongings: All sent with pt  IV: removed  Telemetry: taken off    Pt verbalized understanding of discharge instructions and all questions were answered. Patient is ready for discharge.    Discharge Paperwork: Signed, copied, and sent home with patient.       Amber Darling RN  Cardiology

## 2019-07-23 ENCOUNTER — HOSPITAL ENCOUNTER (OUTPATIENT)
Dept: CARDIAC REHAB | Facility: CLINIC | Age: 64
End: 2019-07-23
Attending: INTERNAL MEDICINE

## 2019-07-23 DIAGNOSIS — I21.19 ACUTE ST ELEVATION MYOCARDIAL INFARCTION (STEMI) OF INFERIOR WALL (H): ICD-10-CM

## 2019-07-23 PROCEDURE — 40000575 ZZH STATISTIC OP CARDIAC VISIT #2

## 2019-07-23 PROCEDURE — 93797 PHYS/QHP OP CAR RHAB WO ECG: CPT

## 2019-07-23 PROCEDURE — 93798 PHYS/QHP OP CAR RHAB W/ECG: CPT

## 2019-07-23 PROCEDURE — 40000116 ZZH STATISTIC OP CR VISIT

## 2019-07-24 ENCOUNTER — TELEPHONE (OUTPATIENT)
Dept: CARDIOLOGY | Facility: CLINIC | Age: 64
End: 2019-07-24

## 2019-07-26 NOTE — TELEPHONE ENCOUNTER
Discharge follow up post PCI: 7/17/19  JUANJOSE x4 to RCA, LM/oLAD, oLCx, dLCx  Spoke with son, Kojo    What does the site look like?  Review:  femoral site  It is normal to have soreness and or bruising at the puncture site and mild tingling in your hand for up to 3 days. The site should be flat and dry.  Groin     No heavy lifting (10 pounds) for 5-7 days.  Reviewed, groin site still bruised    Call your doctor if:    You have a large or growing hard lump around the site.    The site is red, swollen, hot or tender.    Blood or fluid is draining from the site.    You have chills or a fever greater than 101 F (38 C).    Your leg or arm feels numb or cool.    You have hives, a rash or unusual itching.  reviewed    Are you having any pain? denies    How is your activity tolerance?    For 2 days, do NOT have sex or do any heavy exercise.  Do you have someone at home to assist you with your daily activities?  Home with son     Review Medications: Dual antiplatelet therapy  If you have started taking Plavix  do not stop taking it until you talk to your heart doctor (cardiologist). Dual antiplatelet therapy for at least one year  If you have stopped any other medicines, check with your nurse or provider about when to restart them.  Review Nitroglycerin instructions, take one tablet under the tongue for chest pain, if there is no relief take another one 5 minutes apart. If you still have no relief from the chest pain, call 911.    Have you scheduled with Cardiac Rehab? yes     FOLLOW UP  Do you have a follow up appointment with your provider?  none  What other discharge instructions do you have?   none  Are you to get labs, procedures or tests before you see your provider? Cardiac MRI      You are scheduled to see Genna Rivas JOSÉ August 6         CONTACT INFORMATION  Please feel free to call us with any other questions or symptoms that are concerning for you at [phone] if it is after 4:30 in the afternoon, or a weekend  please call [phone] and ask for the on call specialist.  We want to do everything we can to help prevent you needing to return to the ED, so please do not hesitate to call us.

## 2019-08-06 ENCOUNTER — OFFICE VISIT (OUTPATIENT)
Dept: CARDIOLOGY | Facility: CLINIC | Age: 64
End: 2019-08-06
Attending: INTERNAL MEDICINE

## 2019-08-06 ENCOUNTER — TELEPHONE (OUTPATIENT)
Dept: CARDIOLOGY | Facility: CLINIC | Age: 64
End: 2019-08-06

## 2019-08-06 VITALS
HEART RATE: 62 BPM | HEIGHT: 64 IN | SYSTOLIC BLOOD PRESSURE: 158 MMHG | BODY MASS INDEX: 26.19 KG/M2 | DIASTOLIC BLOOD PRESSURE: 80 MMHG | WEIGHT: 153.4 LBS

## 2019-08-06 DIAGNOSIS — I21.19 ST ELEVATION MYOCARDIAL INFARCTION (STEMI) OF INFERIOR WALL (H): ICD-10-CM

## 2019-08-06 DIAGNOSIS — I21.19 ST ELEVATION MYOCARDIAL INFARCTION (STEMI) OF INFERIOR WALL (H): Primary | ICD-10-CM

## 2019-08-06 DIAGNOSIS — I21.19 ACUTE ST ELEVATION MYOCARDIAL INFARCTION (STEMI) OF INFERIOR WALL (H): ICD-10-CM

## 2019-08-06 PROCEDURE — 99214 OFFICE O/P EST MOD 30 MIN: CPT | Performed by: PHYSICIAN ASSISTANT

## 2019-08-06 RX ORDER — LISINOPRIL 5 MG/1
10 TABLET ORAL DAILY
Qty: 120 TABLET | Refills: 0
Start: 2019-08-06 | End: 2019-08-13 | Stop reason: DRUGHIGH

## 2019-08-06 SDOH — HEALTH STABILITY: MENTAL HEALTH: HOW OFTEN DO YOU HAVE A DRINK CONTAINING ALCOHOL?: NEVER

## 2019-08-06 ASSESSMENT — MIFFLIN-ST. JEOR: SCORE: 1386.82

## 2019-08-06 NOTE — PROGRESS NOTES
Cardiology Progress Note    Date of Service: 08/06/2019      Reason for visit: Hospital follow up, inferior STEMI, 3VCAD.      HPI:  Yuri Louis is a pleasant 64 year old Bangladeshi male with no known prior cardiac disease, who is here visiting from the [facility]. He was hospitalized from 7/17 to 7/19 after being brought by EMS for abrupt onset of chest pain, shortness of breath, and altered mental status.   EKG showed 1st degree AV block with ST elevation in leads II, III, and aVF. He was bradycardic and hypotensive and was taken urgently to the cath lab and found to have 100% thrombotic occlusion of the RCA and underwent successful aspiration thrombectomy of the proxmal RCA and stenting with a single JUANJOSE. In addition, he was found to have 3 vessel disease and also under went stenting of the ostial LCx, distal LCx, LMCA, and ostial proximal LAD. He was placed on dual antiplatelet therapy with Plavix and ASA. In addition, he was started on a low dose ACE-I, beta blocker, and statin therapy. He's here today for hospital follow up.    He is here today accompanied by his son who helps interpret when needed. Mr. Louis tells me that he has not had any recurrence of chest pain since discharge. He's feeling quite well overall and denies any palpitations, dizziness, lower extremity edema, GOMEZ, or fatigue. He did attend one cardiac rehab session but tells me he plans to return to the [facility] in ~10 days and will continue this once he returns. He had a CMRI done last week prior to our visit which showed a preserved LVEF of 73%, with no residual WMAs. However, his RV is mildly dilated with moderately reduced function. His RA is also moderately enlarged. There was mention of some subendocardial late gadolinium enhancement in the basal inferoseptal and basal-distal inferior segments consistent with myocardial infarction in the right coronary artery territory. Overall scar burden was reported as 6%. Today on  arrival, his BP was elevated at 158/80, and similar on recheck toward the end of hs visit. Heart rate was under good control at 62.     ASSESSMENT/PLAN:    1. Inferior STEMI, with 3V CAD.   --Presented with acute onset chest pain, SOB, and altered mental status. Found to have inferior STEMI.  Taken urgently to cath lab and underwent aspiration thrombectomy of 100% RCA occlusion, then stenting of the proxmal RCA wiith a single JUANJOSE. He also received stents to his ostial LCx, distal LCx, LMCA, and ostial proximal LAD. He is free of angina or heart failure symptoms today.    --Follow up CMRI showed preserved LVEF 73%, but does have evidence of RCA territory infarct and moderately reduced RV function. Scar burden reported at 6%.    --Continue DAPT with Plavix and ASA 81mg, for a minimum of 1 year.   --Continue atorvastatin 80mg daily. His LDL at the time of admission was 158. This will need to be re-addressed in the next few months.   --I encouraged him to establish with a local cardiologist ASAP once he returns to the [facility] for close follow up, and attend cardiac rehab.     2. Hypertension.   --Not under good control today. Will titrate his lisinopril up to 10mg daily today. I've asked him to return in 1 week prior to his move for a repeat BP check, BMP, and EKG.    --Continue the Toprol XL linda 50mg daily without change. HR under good control in the low 60s.       Follow up: None arranged, as he is moving back to the [facility] in a few weeks.         Orders this Visit:  Orders Placed This Encounter   Procedures     Basic metabolic panel     Follow-Up with Nurse     EKG 12-lead complete w/read - Clinics (performed today)     Orders Placed This Encounter   Medications     lisinopril (PRINIVIL/ZESTRIL) 5 MG tablet     Sig: Take 2 tablets (10 mg) by mouth daily     Dispense:  120 tablet     Refill:  0     Medications Discontinued During This Encounter   Medication Reason     lisinopril (PRINIVIL/ZESTRIL) 5 MG tablet             CURRENT MEDICATIONS:  Current Outpatient Medications   Medication Sig Dispense Refill     aspirin (ASA) 81 MG EC tablet Take 1 tablet (81 mg) by mouth daily       atorvastatin (LIPITOR) 80 MG tablet Take 1 tablet (80 mg) by mouth daily 120 tablet 0     clopidogrel (PLAVIX) 75 MG tablet Take 1 tablet (75 mg) by mouth daily 120 tablet 0     lisinopril (PRINIVIL/ZESTRIL) 5 MG tablet Take 2 tablets (10 mg) by mouth daily 120 tablet 0     metoprolol succinate ER (TOPROL-XL) 50 MG 24 hr tablet Take 1 tablet (50 mg) by mouth daily 120 tablet 0     nitroGLYcerin (NITROSTAT) 0.4 MG sublingual tablet For chest pain place 1 tablet under the tongue every 5 minutes for 3 doses. If symptoms persist 5 minutes after 1st dose call 911. 30 tablet 0       ALLERGIES   No Known Allergies    PAST MEDICAL HISTORY:  Past Medical History:   Diagnosis Date     Bradycardia      Coronary artery disease      Hyperlipidemia      Hypertension      ST elevation myocardial infarction (STEMI) of inferior wall (H) 7/17/2019       PAST SURGICAL HISTORY:  Past Surgical History:   Procedure Laterality Date     CV CORONARY ANGIOGRAM N/A 7/17/2019    Procedure: Coronary Angiogram;  Surgeon: Kael Nina MD;  Location: [facility] CARDIAC CATH LAB     CV PCI STENT DRUG ELUTING N/A 7/17/2019    Procedure: PCI Stent Drug Eluting;  Surgeon: Kael Nina MD;  Location: [facility] CARDIAC CATH LAB       FAMILY HISTORY:  No family history on file.    SOCIAL HISTORY:  Social History     Socioeconomic History     Marital status:      Spouse name: None     Number of children: None     Years of education: None     Highest education level: None   Occupational History     None   Social Needs     Financial resource strain: None     Food insecurity:     Worry: None     Inability: None     Transportation needs:     Medical: None     Non-medical: None   Tobacco Use     Smoking status: Never Smoker     Smokeless tobacco: Never Used   Substance  "and Sexual Activity     Alcohol use: Not Currently     Frequency: Never     Drug use: None     Sexual activity: None   Lifestyle     Physical activity:     Days per week: None     Minutes per session: None     Stress: None   Relationships     Social connections:     Talks on phone: None     Gets together: None     Attends Pentecostal service: None     Active member of club or organization: None     Attends meetings of clubs or organizations: None     Relationship status: None     Intimate partner violence:     Fear of current or ex partner: None     Emotionally abused: None     Physically abused: None     Forced sexual activity: None   Other Topics Concern     None   Social History Narrative     None       Review of Systems:  Cardiovascular: negative for chest pain, palpitations, orthopnea, LE edema  Constitutional: negative for chills, sweats, fevers   Resp: Negative for dyspnea at rest, dyspnea on exertion, known chronic lung disease  HEENT: Negative for new visual changes, frequent headaches  Gastrointestinal: negative for abdominal pain, diarrhea, nausea, vomiting  Hematologic/lymphatic: negative for current systemic anticoagulation, hx of blood clots  Musculoskeletal: negative for new back pain, joint pain  Neurological: negative for focal weakness, LOC, seizures, syncope/presyncope.       Physical Exam:  Vitals: BP (!) 158/80   Pulse 62   Ht 1.626 m (5' 4\")   Wt 69.6 kg (153 lb 6.4 oz)   BMI 26.33 kg/m     Wt Readings from Last 4 Encounters:   08/06/19 69.6 kg (153 lb 6.4 oz)   07/19/19 73.6 kg (162 lb 3.2 oz)       GEN:  In general, this is a well nourished [place] male in no acute distress on room air.  Patient ambulatory, accompanied by his son.   HEENT:  Pupils equal, round. Sclerae nonicteric.   NECK: Supple, no masses appreciated. Trachea midline. No JVD.  C/V:  Regular rate and rhythm, no murmur, rub or gallop.   RESP: Respirations are unlabored. No use of accessory muscles. Clear to auscultation " bilaterally without wheezing, rales, or rhonchi.  GI: Abdomen soft, nontender, nondistended. No HSM appreciated.   EXTREM: No LE edema. No cyanosis or clubbing.  NEURO: Alert and oriented, cooperative. Gait not formally assessed. No obvious focal deficits.   PSYCH: Normal affect.  SKIN: Warm and dry. No rashes or petechiae appreciated.       Recent Lab Results:  LIPID RESULTS:  Lab Results   Component Value Date    CHOL 215 (H) 07/17/2019    HDL 40 07/17/2019     (H) 07/17/2019    TRIG 83 07/17/2019       LIVER ENZYME RESULTS:  Lab Results   Component Value Date    AST 21 07/17/2019    ALT 25 07/17/2019       CBC RESULTS:  Lab Results   Component Value Date    WBC 14.7 (H) 07/19/2019    RBC 4.28 (L) 07/19/2019    HGB 13.4 07/19/2019    HCT 41.5 07/19/2019    MCV 97 07/19/2019    MCH 31.3 07/19/2019    MCHC 32.3 07/19/2019    RDW 12.8 07/19/2019     07/19/2019       BMP RESULTS:  Lab Results   Component Value Date     07/19/2019    POTASSIUM 3.4 07/19/2019    CHLORIDE 106 07/19/2019    CO2 28 07/19/2019    ANIONGAP 5 07/19/2019     (H) 07/19/2019    BUN 18 07/19/2019    CR 1.03 07/19/2019    GFRESTIMATED 75 07/19/2019    GFRESTBLACK 87 07/19/2019    ASHLEY 7.9 (L) 07/19/2019        A1C RESULTS:  Lab Results   Component Value Date    A1C 5.9 (H) 07/18/2019       Additional pertinent testing:  CMRI 7/18/19  Comparison CMR: none     1. The left ventricle is normal in cavity size with mild concentric remodeling. The global systolic  function is normal. The LVEF is 73 %. There are no regional wall motion abnormalities.   2. The right ventricle is mildly dilated on visual assessment. The global systolic function is moderately  reduced. The RVEF is 31%.    3. The right atrium is moderately enlarged and the left atrium is normal in size.   4. There is tricuspid regurgitation.    5. There is subendocardial late gadolinium enhancement in the basal inferoseptal and basal-distal inferior  segments  consistent with myocardial infarction in the right coronary artery territory.            Overall scar burden  6%.    6. There is no pericardial effusion or thickening.   7. There is no intracardiac thrombus.   8. There is no myocardial edema.     CONCLUSIONS:   Ischemic heart disease with myocardial infarction in the right coronary artery territory.   Normal left ventricular function, LVEF 73% and moderately reduced right ventricular function, RVEF 31%        Genna Fernandez PA-C  [facility]  Pager [phone]

## 2019-08-06 NOTE — PATIENT INSTRUCTIONS
Visit Summary:    Today we discussed:   1. Your blood pressure is still high---INCREASE the lisinopril to 10mg (take two of the 5mg tabs) once daily.  2. Return in 1 week to have labs drawn and a nurse to recheck your blood pressure.     At that time you can check with medical records to get a copy of today's visit note to take with you to the [facility]     I would encourage you to participate in cardiac rehab once you are back.     Please call my nurse Radha at [phone] with any questions or concerns.

## 2019-08-06 NOTE — LETTER
8/6/2019    Physician No Ref-Primary  No address on file    RE: Yuri Louis       Dear Colleague,    I had the pleasure of seeing Yuri Louis in the [facility].      Cardiology Progress Note    Date of Service: 08/06/2019      Reason for visit: Hospital follow up, inferior STEMI, 3VCAD.      HPI:  Yuri Louis is a pleasant 64 year old Citizen of [place] male with no known prior cardiac disease, who is here visiting from the [facility]. He was hospitalized from 7/17 to 7/19 after being brought by EMS for abrupt onset of chest pain, shortness of breath, and altered mental status.   EKG showed 1st degree AV block with ST elevation in leads II, III, and aVF. He was bradycardic and hypotensive and was taken urgently to the cath lab and found to have 100% thrombotic occlusion of the RCA and underwent successful aspiration thrombectomy of the proxmal RCA and stenting with a single JUANJOSE. In addition, he was found to have 3 vessel disease and also under went stenting of the ostial LCx, distal LCx, LMCA, and ostial proximal LAD. He was placed on dual antiplatelet therapy with Plavix and ASA. In addition, he was started on a low dose ACE-I, beta blocker, and statin therapy. He's here today for hospital follow up.    He is here today accompanied by his son who helps interpret when needed. Mr. Louis tells me that he has not had any recurrence of chest pain since discharge. He's feeling quite well overall and denies any palpitations, dizziness, lower extremity edema, GOMEZ, or fatigue. He did attend one cardiac rehab session but tells me he plans to return to the [facility] in ~10 days and will continue this once he returns. He had a CMRI done last week prior to our visit which showed a preserved LVEF of 73%, with no residual WMAs. However, his RV is mildly dilated with moderately reduced function. His RA is also moderately enlarged. There was mention of some subendocardial late  gadolinium enhancement in the basal inferoseptal and basal-distal inferior segments consistent with myocardial infarction in the right coronary artery territory. Overall scar burden was reported as 6%. Today on arrival, his BP was elevated at 158/80, and similar on recheck toward the end of hs visit. Heart rate was under good control at 62.     ASSESSMENT/PLAN:    1. Inferior STEMI, with 3V CAD.   --Presented with acute onset chest pain, SOB, and altered mental status. Found to have inferior STEMI.  Taken urgently to cath lab and underwent aspiration thrombectomy of 100% RCA occlusion, then stenting of the proxmal RCA wiith a single JUANJOSE. He also received stents to his ostial LCx, distal LCx, LMCA, and ostial proximal LAD. He is free of angina or heart failure symptoms today.    --Follow up CMRI showed preserved LVEF 73%, but does have evidence of RCA territory infarct and moderately reduced RV function. Scar burden reported at 6%.    --Continue DAPT with Plavix and ASA 81mg, for a minimum of 1 year.   --Continue atorvastatin 80mg daily. His LDL at the time of admission was 158. This will need to be re-addressed in the next few months.   --I encouraged him to establish with a local cardiologist ASA once he returns to the [facility] for close follow up, and attend cardiac rehab.     2. Hypertension.   --Not under good control today. Will titrate his lisinopril up to 10mg daily today. I've asked him to return in 1 week prior to his move for a repeat BP check, BMP, and EKG.    --Continue the Toprol XL linda 50mg daily without change. HR under good control in the low 60s.       Follow up: None arranged, as he is moving back to the [facility] in a few weeks.         Orders this Visit:  Orders Placed This Encounter   Procedures     Basic metabolic panel     Follow-Up with Nurse     EKG 12-lead complete w/read - Clinics (performed today)     Orders Placed This Encounter   Medications     lisinopril (PRINIVIL/ZESTRIL) 5 MG  tablet     Sig: Take 2 tablets (10 mg) by mouth daily     Dispense:  120 tablet     Refill:  0     Medications Discontinued During This Encounter   Medication Reason     lisinopril (PRINIVIL/ZESTRIL) 5 MG tablet            CURRENT MEDICATIONS:  Current Outpatient Medications   Medication Sig Dispense Refill     aspirin (ASA) 81 MG EC tablet Take 1 tablet (81 mg) by mouth daily       atorvastatin (LIPITOR) 80 MG tablet Take 1 tablet (80 mg) by mouth daily 120 tablet 0     clopidogrel (PLAVIX) 75 MG tablet Take 1 tablet (75 mg) by mouth daily 120 tablet 0     lisinopril (PRINIVIL/ZESTRIL) 5 MG tablet Take 2 tablets (10 mg) by mouth daily 120 tablet 0     metoprolol succinate ER (TOPROL-XL) 50 MG 24 hr tablet Take 1 tablet (50 mg) by mouth daily 120 tablet 0     nitroGLYcerin (NITROSTAT) 0.4 MG sublingual tablet For chest pain place 1 tablet under the tongue every 5 minutes for 3 doses. If symptoms persist 5 minutes after 1st dose call 911. 30 tablet 0       ALLERGIES   No Known Allergies    PAST MEDICAL HISTORY:  Past Medical History:   Diagnosis Date     Bradycardia      Coronary artery disease      Hyperlipidemia      Hypertension      ST elevation myocardial infarction (STEMI) of inferior wall (H) 7/17/2019       PAST SURGICAL HISTORY:  Past Surgical History:   Procedure Laterality Date     CV CORONARY ANGIOGRAM N/A 7/17/2019    Procedure: Coronary Angiogram;  Surgeon: Kael Nina MD;  Location: [facility] CARDIAC CATH LAB     CV PCI STENT DRUG ELUTING N/A 7/17/2019    Procedure: PCI Stent Drug Eluting;  Surgeon: Kael Nina MD;  Location: [facility] CARDIAC CATH LAB       FAMILY HISTORY:  No family history on file.    SOCIAL HISTORY:  Social History     Socioeconomic History     Marital status:      Spouse name: None     Number of children: None     Years of education: None     Highest education level: None   Occupational History     None   Social Needs     Financial resource strain: None      "Food insecurity:     Worry: None     Inability: None     Transportation needs:     Medical: None     Non-medical: None   Tobacco Use     Smoking status: Never Smoker     Smokeless tobacco: Never Used   Substance and Sexual Activity     Alcohol use: Not Currently     Frequency: Never     Drug use: None     Sexual activity: None   Lifestyle     Physical activity:     Days per week: None     Minutes per session: None     Stress: None   Relationships     Social connections:     Talks on phone: None     Gets together: None     Attends Evangelical service: None     Active member of club or organization: None     Attends meetings of clubs or organizations: None     Relationship status: None     Intimate partner violence:     Fear of current or ex partner: None     Emotionally abused: None     Physically abused: None     Forced sexual activity: None   Other Topics Concern     None   Social History Narrative     None       Review of Systems:  Cardiovascular: negative for chest pain, palpitations, orthopnea, LE edema  Constitutional: negative for chills, sweats, fevers   Resp: Negative for dyspnea at rest, dyspnea on exertion, known chronic lung disease  HEENT: Negative for new visual changes, frequent headaches  Gastrointestinal: negative for abdominal pain, diarrhea, nausea, vomiting  Hematologic/lymphatic: negative for current systemic anticoagulation, hx of blood clots  Musculoskeletal: negative for new back pain, joint pain  Neurological: negative for focal weakness, LOC, seizures, syncope/presyncope.       Physical Exam:  Vitals: BP (!) 158/80   Pulse 62   Ht 1.626 m (5' 4\")   Wt 69.6 kg (153 lb 6.4 oz)   BMI 26.33 kg/m      Wt Readings from Last 4 Encounters:   08/06/19 69.6 kg (153 lb 6.4 oz)   07/19/19 73.6 kg (162 lb 3.2 oz)       GEN:  In general, this is a well nourished [place] male in no acute distress on room air.  Patient ambulatory, accompanied by his son.   HEENT:  Pupils equal, round. Sclerae nonicteric. "   NECK: Supple, no masses appreciated. Trachea midline. No JVD.  C/V:  Regular rate and rhythm, no murmur, rub or gallop.   RESP: Respirations are unlabored. No use of accessory muscles. Clear to auscultation bilaterally without wheezing, rales, or rhonchi.  GI: Abdomen soft, nontender, nondistended. No HSM appreciated.   EXTREM: No LE edema. No cyanosis or clubbing.  NEURO: Alert and oriented, cooperative. Gait not formally assessed. No obvious focal deficits.   PSYCH: Normal affect.  SKIN: Warm and dry. No rashes or petechiae appreciated.       Recent Lab Results:  LIPID RESULTS:  Lab Results   Component Value Date    CHOL 215 (H) 07/17/2019    HDL 40 07/17/2019     (H) 07/17/2019    TRIG 83 07/17/2019       LIVER ENZYME RESULTS:  Lab Results   Component Value Date    AST 21 07/17/2019    ALT 25 07/17/2019       CBC RESULTS:  Lab Results   Component Value Date    WBC 14.7 (H) 07/19/2019    RBC 4.28 (L) 07/19/2019    HGB 13.4 07/19/2019    HCT 41.5 07/19/2019    MCV 97 07/19/2019    MCH 31.3 07/19/2019    MCHC 32.3 07/19/2019    RDW 12.8 07/19/2019     07/19/2019       BMP RESULTS:  Lab Results   Component Value Date     07/19/2019    POTASSIUM 3.4 07/19/2019    CHLORIDE 106 07/19/2019    CO2 28 07/19/2019    ANIONGAP 5 07/19/2019     (H) 07/19/2019    BUN 18 07/19/2019    CR 1.03 07/19/2019    GFRESTIMATED 75 07/19/2019    GFRESTBLACK 87 07/19/2019    ASHLEY 7.9 (L) 07/19/2019        A1C RESULTS:  Lab Results   Component Value Date    A1C 5.9 (H) 07/18/2019       Additional pertinent testing:  CMRI 7/18/19  Comparison CMR: none     1. The left ventricle is normal in cavity size with mild concentric remodeling. The global systolic  function is normal. The LVEF is 73 %. There are no regional wall motion abnormalities.   2. The right ventricle is mildly dilated on visual assessment. The global systolic function is moderately  reduced. The RVEF is 31%.    3. The right atrium is moderately  enlarged and the left atrium is normal in size.   4. There is tricuspid regurgitation.    5. There is subendocardial late gadolinium enhancement in the basal inferoseptal and basal-distal inferior  segments consistent with myocardial infarction in the right coronary artery territory.            Overall scar burden  6%.    6. There is no pericardial effusion or thickening.   7. There is no intracardiac thrombus.   8. There is no myocardial edema.     CONCLUSIONS:   Ischemic heart disease with myocardial infarction in the right coronary artery territory.   Normal left ventricular function, LVEF 73% and moderately reduced right ventricular function, RVEF 31%        Genna Fernandez PA-C  [facility]  Pager [phone]      Thank you for allowing me to participate in the care of your patient.      Sincerely,     HARRIS Rahman     [facility] [place]    cc:   Kael Nina MD  [address] [place] [address] [id number]  [place] [address]

## 2019-08-06 NOTE — TELEPHONE ENCOUNTER
Added ECG to 1 week BP check and labs at the request of Genna IGLESIAS.     Genna is available at [facility] next week  - JOSÉ requests support staff review BP/ekg before letting patient go.    Order placed for ECG and updated visit notes.  Radha Woodruff RN 08/06/19 12:25 PM

## 2019-08-06 NOTE — PROGRESS NOTES
Cardiac Rehab Discharge Summary    Reason for discharge:    Patient/family request discontinuation of services.    Progress towards goals:  Goals not met.  Barriers to achieving goals:   discharge on same date as initial evaluation.    Recommendation(s):    Continue home exercise program.    Physician cosignature/electronic signature indicates agreements with the ITP document and approval of discharge.

## 2019-08-06 NOTE — LETTER
8/6/2019    Kael Nina MD  [address] [facility] [address]  [place] [address]    RE: Yuri Louis       Dear Colleague,    I had the pleasure of seeing Yuri Louis in the [facility].      Cardiology Progress Note    Date of Service: 08/06/2019      Reason for visit: Hospital follow up, inferior STEMI, 3VCAD.      HPI:  Yuri Louis is a pleasant 64 year old Malaysian male with no known prior cardiac disease, who is here visiting from the [facility]. He was hospitalized from 7/17 to 7/19 after being brought by EMS for abrupt onset of chest pain, shortness of breath, and altered mental status.   EKG showed 1st degree AV block with ST elevation in leads II, III, and aVF. He was bradycardic and hypotensive and was taken urgently to the cath lab and found to have 100% thrombotic occlusion of the RCA and underwent successful aspiration thrombectomy of the proxmal RCA and stenting with a single JUANJOSE. In addition, he was found to have 3 vessel disease and also under went stenting of the ostial LCx, distal LCx, LMCA, and ostial proximal LAD. He was placed on dual antiplatelet therapy with Plavix and ASA. In addition, he was started on a low dose ACE-I, beta blocker, and statin therapy. He's here today for hospital follow up.    He is here today accompanied by his son who helps interpret when needed. Mr. Louis tells me that he has not had any recurrence of chest pain since discharge. He's feeling quite well overall and denies any palpitations, dizziness, lower extremity edema, GOMEZ, or fatigue. He did attend one cardiac rehab session but tells me he plans to return to the [facility] in ~10 days and will continue this once he returns. He had a CMRI done last week prior to our visit which showed a preserved LVEF of 73%, with no residual WMAs. However, his RV is mildly dilated with moderately reduced function. His RA is also moderately enlarged. There was mention of some  subendocardial late gadolinium enhancement in the basal inferoseptal and basal-distal inferior segments consistent with myocardial infarction in the right coronary artery territory. Overall scar burden was reported as 6%. Today on arrival, his BP was elevated at 158/80, and similar on recheck toward the end of hs visit. Heart rate was under good control at 62.     ASSESSMENT/PLAN:    1. Inferior STEMI, with 3V CAD.   --Presented with acute onset chest pain, SOB, and altered mental status. Found to have inferior STEMI.  Taken urgently to cath lab and underwent aspiration thrombectomy of 100% RCA occlusion, then stenting of the proxmal RCA wiith a single JUANJOSE. He also received stents to his ostial LCx, distal LCx, LMCA, and ostial proximal LAD. He is free of angina or heart failure symptoms today.    --Follow up CMRI showed preserved LVEF 73%, but does have evidence of RCA territory infarct and moderately reduced RV function. Scar burden reported at 6%.    --Continue DAPT with Plavix and ASA 81mg, for a minimum of 1 year.   --Continue atorvastatin 80mg daily. His LDL at the time of admission was 158. This will need to be re-addressed in the next few months.   --I encouraged him to establish with a local cardiologist ASAP once he returns to the [facility] for close follow up, and attend cardiac rehab.     2. Hypertension.   --Not under good control today. Will titrate his lisinopril up to 10mg daily today. I've asked him to return in 1 week prior to his move for a repeat BP check, BMP, and EKG.    --Continue the Toprol XL linda 50mg daily without change. HR under good control in the low 60s.       Follow up: None arranged, as he is moving back to the [facility] in a few weeks.         Orders this Visit:  Orders Placed This Encounter   Procedures     Basic metabolic panel     Follow-Up with Nurse     EKG 12-lead complete w/read - Clinics (performed today)     Orders Placed This Encounter   Medications     lisinopril  (PRINIVIL/ZESTRIL) 5 MG tablet     Sig: Take 2 tablets (10 mg) by mouth daily     Dispense:  120 tablet     Refill:  0     Medications Discontinued During This Encounter   Medication Reason     lisinopril (PRINIVIL/ZESTRIL) 5 MG tablet            CURRENT MEDICATIONS:  Current Outpatient Medications   Medication Sig Dispense Refill     aspirin (ASA) 81 MG EC tablet Take 1 tablet (81 mg) by mouth daily       atorvastatin (LIPITOR) 80 MG tablet Take 1 tablet (80 mg) by mouth daily 120 tablet 0     clopidogrel (PLAVIX) 75 MG tablet Take 1 tablet (75 mg) by mouth daily 120 tablet 0     lisinopril (PRINIVIL/ZESTRIL) 5 MG tablet Take 2 tablets (10 mg) by mouth daily 120 tablet 0     metoprolol succinate ER (TOPROL-XL) 50 MG 24 hr tablet Take 1 tablet (50 mg) by mouth daily 120 tablet 0     nitroGLYcerin (NITROSTAT) 0.4 MG sublingual tablet For chest pain place 1 tablet under the tongue every 5 minutes for 3 doses. If symptoms persist 5 minutes after 1st dose call 911. 30 tablet 0       ALLERGIES   No Known Allergies    PAST MEDICAL HISTORY:  Past Medical History:   Diagnosis Date     Bradycardia      Coronary artery disease      Hyperlipidemia      Hypertension      ST elevation myocardial infarction (STEMI) of inferior wall (H) 7/17/2019       PAST SURGICAL HISTORY:  Past Surgical History:   Procedure Laterality Date     CV CORONARY ANGIOGRAM N/A 7/17/2019    Procedure: Coronary Angiogram;  Surgeon: Kael Nina MD;  Location: [facility] CARDIAC CATH LAB     CV PCI STENT DRUG ELUTING N/A 7/17/2019    Procedure: PCI Stent Drug Eluting;  Surgeon: Kael Nina MD;  Location:  HEART CARDIAC CATH LAB       FAMILY HISTORY:  No family history on file.    SOCIAL HISTORY:  Social History     Socioeconomic History     Marital status:      Spouse name: None     Number of children: None     Years of education: None     Highest education level: None   Occupational History     None   Social Needs     Financial  "resource strain: None     Food insecurity:     Worry: None     Inability: None     Transportation needs:     Medical: None     Non-medical: None   Tobacco Use     Smoking status: Never Smoker     Smokeless tobacco: Never Used   Substance and Sexual Activity     Alcohol use: Not Currently     Frequency: Never     Drug use: None     Sexual activity: None   Lifestyle     Physical activity:     Days per week: None     Minutes per session: None     Stress: None   Relationships     Social connections:     Talks on phone: None     Gets together: None     Attends [employer] service: None     Active member of club or organization: None     Attends meetings of clubs or organizations: None     Relationship status: None     Intimate partner violence:     Fear of current or ex partner: None     Emotionally abused: None     Physically abused: None     Forced sexual activity: None   Other Topics Concern     None   Social History Narrative     None       Review of Systems:  Cardiovascular: negative for chest pain, palpitations, orthopnea, LE edema  Constitutional: negative for chills, sweats, fevers   Resp: Negative for dyspnea at rest, dyspnea on exertion, known chronic lung disease  HEENT: Negative for new visual changes, frequent headaches  Gastrointestinal: negative for abdominal pain, diarrhea, nausea, vomiting  Hematologic/lymphatic: negative for current systemic anticoagulation, hx of blood clots  Musculoskeletal: negative for new back pain, joint pain  Neurological: negative for focal weakness, LOC, seizures, syncope/presyncope.       Physical Exam:  Vitals: BP (!) 158/80   Pulse 62   Ht 1.626 m (5' 4\")   Wt 69.6 kg (153 lb 6.4 oz)   BMI 26.33 kg/m      Wt Readings from Last 4 Encounters:   08/06/19 69.6 kg (153 lb 6.4 oz)   07/19/19 73.6 kg (162 lb 3.2 oz)       GEN:  In general, this is a well nourished [place]inames[place] male in no acute distress on room air.  Patient ambulatory, accompanied by his son.   HEENT:  Pupils equal, " round. Sclerae nonicteric.   NECK: Supple, no masses appreciated. Trachea midline. No JVD.  C/V:  Regular rate and rhythm, no murmur, rub or gallop.   RESP: Respirations are unlabored. No use of accessory muscles. Clear to auscultation bilaterally without wheezing, rales, or rhonchi.  GI: Abdomen soft, nontender, nondistended. No HSM appreciated.   EXTREM: No LE edema. No cyanosis or clubbing.  NEURO: Alert and oriented, cooperative. Gait not formally assessed. No obvious focal deficits.   PSYCH: Normal affect.  SKIN: Warm and dry. No rashes or petechiae appreciated.       Recent Lab Results:  LIPID RESULTS:  Lab Results   Component Value Date    CHOL 215 (H) 07/17/2019    HDL 40 07/17/2019     (H) 07/17/2019    TRIG 83 07/17/2019       LIVER ENZYME RESULTS:  Lab Results   Component Value Date    AST 21 07/17/2019    ALT 25 07/17/2019       CBC RESULTS:  Lab Results   Component Value Date    WBC 14.7 (H) 07/19/2019    RBC 4.28 (L) 07/19/2019    HGB 13.4 07/19/2019    HCT 41.5 07/19/2019    MCV 97 07/19/2019    MCH 31.3 07/19/2019    MCHC 32.3 07/19/2019    RDW 12.8 07/19/2019     07/19/2019       BMP RESULTS:  Lab Results   Component Value Date     07/19/2019    POTASSIUM 3.4 07/19/2019    CHLORIDE 106 07/19/2019    CO2 28 07/19/2019    ANIONGAP 5 07/19/2019     (H) 07/19/2019    BUN 18 07/19/2019    CR 1.03 07/19/2019    GFRESTIMATED 75 07/19/2019    GFRESTBLACK 87 07/19/2019    ASHLEY 7.9 (L) 07/19/2019        A1C RESULTS:  Lab Results   Component Value Date    A1C 5.9 (H) 07/18/2019       Additional pertinent testing:  CMRI 7/18/19  Comparison CMR: none     1. The left ventricle is normal in cavity size with mild concentric remodeling. The global systolic  function is normal. The LVEF is 73 %. There are no regional wall motion abnormalities.   2. The right ventricle is mildly dilated on visual assessment. The global systolic function is moderately  reduced. The RVEF is 31%.    3. The right  atrium is moderately enlarged and the left atrium is normal in size.   4. There is tricuspid regurgitation.    5. There is subendocardial late gadolinium enhancement in the basal inferoseptal and basal-distal inferior  segments consistent with myocardial infarction in the right coronary artery territory.            Overall scar burden  6%.    6. There is no pericardial effusion or thickening.   7. There is no intracardiac thrombus.   8. There is no myocardial edema.     CONCLUSIONS:   Ischemic heart disease with myocardial infarction in the right coronary artery territory.   Normal left ventricular function, LVEF 73% and moderately reduced right ventricular function, RVEF 31%        Genna Fernandez PA-C  [facility]  Pager [phone]      Thank you for allowing me to participate in the care of your patient.    Sincerely,     HARRIS Rahman     [facility]

## 2019-08-13 ENCOUNTER — ALLIED HEALTH/NURSE VISIT (OUTPATIENT)
Dept: CARDIOLOGY | Facility: CLINIC | Age: 64
End: 2019-08-13
Attending: PHYSICIAN ASSISTANT

## 2019-08-13 VITALS — DIASTOLIC BLOOD PRESSURE: 86 MMHG | SYSTOLIC BLOOD PRESSURE: 150 MMHG | HEART RATE: 58 BPM

## 2019-08-13 DIAGNOSIS — I10 BENIGN ESSENTIAL HYPERTENSION: ICD-10-CM

## 2019-08-13 DIAGNOSIS — I21.19 ACUTE ST ELEVATION MYOCARDIAL INFARCTION (STEMI) OF INFERIOR WALL (H): ICD-10-CM

## 2019-08-13 DIAGNOSIS — I21.19 ST ELEVATION MYOCARDIAL INFARCTION (STEMI) OF INFERIOR WALL (H): Primary | ICD-10-CM

## 2019-08-13 LAB
ANION GAP SERPL CALCULATED.3IONS-SCNC: 12.7 MMOL/L (ref 6–17)
BUN SERPL-MCNC: 18 MG/DL (ref 7–30)
CALCIUM SERPL-MCNC: 9.6 MG/DL (ref 8.5–10.5)
CHLORIDE SERPL-SCNC: 104 MMOL/L (ref 98–107)
CO2 SERPL-SCNC: 27 MMOL/L (ref 23–29)
CREAT SERPL-MCNC: 1.26 MG/DL (ref 0.7–1.3)
GFR SERPL CREATININE-BSD FRML MDRD: 57 ML/MIN/{1.73_M2}
GLUCOSE SERPL-MCNC: 105 MG/DL (ref 70–105)
POTASSIUM SERPL-SCNC: 3.7 MMOL/L (ref 3.5–5.1)
SODIUM SERPL-SCNC: 140 MMOL/L (ref 136–145)

## 2019-08-13 PROCEDURE — 36415 COLL VENOUS BLD VENIPUNCTURE: CPT | Performed by: PHYSICIAN ASSISTANT

## 2019-08-13 PROCEDURE — 93000 ELECTROCARDIOGRAM COMPLETE: CPT | Performed by: PHYSICIAN ASSISTANT

## 2019-08-13 PROCEDURE — 80048 BASIC METABOLIC PNL TOTAL CA: CPT | Performed by: PHYSICIAN ASSISTANT

## 2019-08-13 PROCEDURE — 99212 OFFICE O/P EST SF 10 MIN: CPT | Performed by: PHYSICIAN ASSISTANT

## 2019-08-13 RX ORDER — LISINOPRIL 10 MG/1
10 TABLET ORAL 2 TIMES DAILY
Qty: 120 TABLET | Refills: 3 | Status: SHIPPED | OUTPATIENT
Start: 2019-08-13

## 2019-08-13 NOTE — PROGRESS NOTES
Short visit   August 13, 2019  10:52 AM    Patient returns for a BP check and labs done after increase in lisinopril to 10mg daily.  BP in clinic: 150/86, on recheck 160/88.    Labs:    Results for KATHERINE BELL (MRN [id number]) as of 8/13/2019 10:53   Ref. Range 8/13/2019 09:23   Sodium Latest Ref Range: 136 - 145 mmol/L 140   Potassium Latest Ref Range: 3.5 - 5.1 mmol/L 3.7   Chloride Latest Ref Range: 98 - 107 mmol/L 104   Carbon Dioxide Latest Ref Range: 23 - 29 mmol/L 27   Urea Nitrogen Latest Ref Range: 7 - 30 mg/dL 18   Creatinine Latest Ref Range: 0.70 - 1.30 mg/dL 1.26   GFR Estimate Latest Ref Range: >60 mL/min/1.73_m2 57 (L)   GFR Estimate If Black Latest Ref Range: >60 mL/min/1.73_m2 69   Calcium Latest Ref Range: 8.5 - 10.5 mg/dL 9.6   Anion Gap Latest Ref Range: 6 - 17 mmol/L 12.7       EKG done in clinic today showed sinus bradycardia, no new ischemic changes.    PLAN:  Still with hypertension.  Will increase lisinopril to 10mg TWICE DAILY. New prescription sent. Will need follow up labs (BMP) after he get to the [facility]. He tells me has an appt on Aug 28 with a cardiologist to establish care.  Records printed for him to take with him.    Genna Fernandez PA-C  [facility]  Pager [phone]

## 2023-05-08 NOTE — PLAN OF CARE
"/78 (BP Location: Right arm)   Pulse 68   Temp 98.4  F (36.9  C) (Oral)   Resp 16   Ht 1.626 m (5' 4\")   Wt 73.6 kg (162 lb 3.2 oz)   SpO2 94%   BMI 27.84 kg/m      D: Patient admitted on 7/17 with chest pain, shortness of breath and AMS.  I/A: Patient was found to havd 3-vessels disease s/p thrombectomy/JUANJOSE distal LCx, JUANJOSE LM and Ostial LAD (3-stents).  R-groin with dried blood, with soft to touch. Applied ice and oral tylenol.  P: Will continue to monitor and notify MD of status changes.  " 08-May-2023 12:31

## (undated) DEVICE — WIRE GUIDE HI-TRQ BALANCE MDWT JTIP 0.014"X190CM 1001780J-HC

## (undated) DEVICE — CLOSURE DEVICE 6FR VASC PROGLIDE MEDICATED SUTURE 12673-03

## (undated) DEVICE — 0.014IN X 182 CM, J-CURVE, PT GRAPHIX INT GUIDEWIRE (EA=1)

## (undated) DEVICE — GUIDEWIRE VASC 185CM .014IN PT2 JTP  H7493893103J0

## (undated) DEVICE — INTRO SHEATH 7FRX25CM PINNACLE RSS706

## (undated) DEVICE — CATH BALLOON EMERGE 2.75X20MMH7493918920270

## (undated) DEVICE — KIT HAND CONTROL ACIST 014644 AR-P54

## (undated) DEVICE — CATH BALLOON NC EMERGE 4.50X8MM H7493926708450

## (undated) DEVICE — CATH ANGIO SUPERTORQUE PLUS JL4 6FRX100CM 533620

## (undated) DEVICE — CATH GUIDING BLUE YELLOW PTFE XB3.5 6FRX100CM 67005400

## (undated) DEVICE — CATH BALLOON EMERGE 2.75X8MM H7493918908270

## (undated) DEVICE — PACK HEART LEFT CUSTOM

## (undated) DEVICE — CATH BALLOON EMERGE 3.0X20MM H7493918920300

## (undated) DEVICE — INTRO SHEATH 6FRX25CM PINNACLE RSS606

## (undated) DEVICE — KIT CATHETER INDIGO RX 140CM WITH LG LUMEN ASP TUBING

## (undated) DEVICE — CATH BALLOON NC EMERGE 3.25X15MM H7493926715320

## (undated) DEVICE — CATH GUIDING BLUE YELLOW PTFE JR4 6FRX100CM 67008200

## (undated) DEVICE — KIT ACCESSORY INTRO INFLATION SYS 20/30 PRIORITY 1000186-115

## (undated) RX ORDER — FENTANYL CITRATE 50 UG/ML
INJECTION, SOLUTION INTRAMUSCULAR; INTRAVENOUS
Status: DISPENSED
Start: 2019-07-17

## (undated) RX ORDER — HEPARIN SODIUM 1000 [USP'U]/ML
INJECTION, SOLUTION INTRAVENOUS; SUBCUTANEOUS
Status: DISPENSED
Start: 2019-07-17

## (undated) RX ORDER — EPTIFIBATIDE 2 MG/ML
INJECTION, SOLUTION INTRAVENOUS
Status: DISPENSED
Start: 2019-07-17

## (undated) RX ORDER — POTASSIUM CHLORIDE 7.45 MG/ML
INJECTION INTRAVENOUS
Status: DISPENSED
Start: 2019-07-17

## (undated) RX ORDER — LIDOCAINE HYDROCHLORIDE 10 MG/ML
INJECTION, SOLUTION EPIDURAL; INFILTRATION; INTRACAUDAL; PERINEURAL
Status: DISPENSED
Start: 2019-07-17